# Patient Record
Sex: MALE | Race: WHITE | NOT HISPANIC OR LATINO | Employment: OTHER | URBAN - METROPOLITAN AREA
[De-identification: names, ages, dates, MRNs, and addresses within clinical notes are randomized per-mention and may not be internally consistent; named-entity substitution may affect disease eponyms.]

---

## 2021-09-21 ENCOUNTER — HOSPITAL ENCOUNTER (EMERGENCY)
Facility: HOSPITAL | Age: 51
Discharge: HOME | End: 2021-09-21
Attending: EMERGENCY MEDICINE

## 2021-09-21 VITALS
RESPIRATION RATE: 16 BRPM | OXYGEN SATURATION: 98 % | WEIGHT: 230 LBS | BODY MASS INDEX: 34.86 KG/M2 | HEIGHT: 68 IN | DIASTOLIC BLOOD PRESSURE: 86 MMHG | HEART RATE: 72 BPM | TEMPERATURE: 98 F | SYSTOLIC BLOOD PRESSURE: 164 MMHG

## 2021-09-21 DIAGNOSIS — K65.4 MESENTERIC PANNICULITIS: ICD-10-CM

## 2021-09-21 DIAGNOSIS — R10.9 ABDOMINAL WALL PAIN: Primary | ICD-10-CM

## 2021-09-21 LAB
ALBUMIN SERPL BCP-MCNC: 3.7 G/DL (ref 3.5–5.2)
ALP SERPL-CCNC: 54 U/L (ref 55–135)
ALT SERPL W/O P-5'-P-CCNC: 27 U/L (ref 10–44)
ANION GAP SERPL CALC-SCNC: 10 MMOL/L (ref 8–16)
AST SERPL-CCNC: 21 U/L (ref 10–40)
BACTERIA #/AREA URNS HPF: NEGATIVE /HPF
BASOPHILS # BLD AUTO: 0.05 K/UL (ref 0–0.2)
BASOPHILS NFR BLD: 0.8 % (ref 0–1.9)
BILIRUB SERPL-MCNC: 0.8 MG/DL (ref 0.1–1)
BILIRUB UR QL STRIP: NEGATIVE
BUN SERPL-MCNC: 13 MG/DL (ref 6–20)
CALCIUM SERPL-MCNC: 8.7 MG/DL (ref 8.7–10.5)
CHLORIDE SERPL-SCNC: 102 MMOL/L (ref 95–110)
CLARITY UR: CLEAR
CO2 SERPL-SCNC: 29 MMOL/L (ref 23–29)
COLOR UR: YELLOW
CREAT SERPL-MCNC: 0.8 MG/DL (ref 0.5–1.4)
CREAT SERPL-MCNC: 0.9 MG/DL (ref 0.5–1.4)
CRP SERPL-MCNC: 0.8 MG/DL
DIFFERENTIAL METHOD: NORMAL
EOSINOPHIL # BLD AUTO: 0.1 K/UL (ref 0–0.5)
EOSINOPHIL NFR BLD: 1.7 % (ref 0–8)
ERYTHROCYTE [DISTWIDTH] IN BLOOD BY AUTOMATED COUNT: 12.7 % (ref 11.5–14.5)
ERYTHROCYTE [SEDIMENTATION RATE] IN BLOOD BY WESTERGREN METHOD: 3 MM/HR (ref 0–10)
EST. GFR  (AFRICAN AMERICAN): >60 ML/MIN/1.73 M^2
EST. GFR  (NON AFRICAN AMERICAN): >60 ML/MIN/1.73 M^2
GLUCOSE SERPL-MCNC: 115 MG/DL (ref 70–110)
GLUCOSE UR QL STRIP: ABNORMAL
HCT VFR BLD AUTO: 46.4 % (ref 40–54)
HGB BLD-MCNC: 15.4 G/DL (ref 14–18)
HGB UR QL STRIP: NEGATIVE
HYALINE CASTS #/AREA URNS LPF: 9 /LPF
IMM GRANULOCYTES # BLD AUTO: 0.03 K/UL (ref 0–0.04)
IMM GRANULOCYTES NFR BLD AUTO: 0.5 % (ref 0–0.5)
KETONES UR QL STRIP: NEGATIVE
LEUKOCYTE ESTERASE UR QL STRIP: ABNORMAL
LIPASE SERPL-CCNC: 26 U/L (ref 4–60)
LYMPHOCYTES # BLD AUTO: 1.5 K/UL (ref 1–4.8)
LYMPHOCYTES NFR BLD: 23.3 % (ref 18–48)
MCH RBC QN AUTO: 30.9 PG (ref 27–31)
MCHC RBC AUTO-ENTMCNC: 33.2 G/DL (ref 32–36)
MCV RBC AUTO: 93 FL (ref 82–98)
MICROSCOPIC COMMENT: ABNORMAL
MONOCYTES # BLD AUTO: 0.5 K/UL (ref 0.3–1)
MONOCYTES NFR BLD: 8.5 % (ref 4–15)
NEUTROPHILS # BLD AUTO: 4.1 K/UL (ref 1.8–7.7)
NEUTROPHILS NFR BLD: 65.2 % (ref 38–73)
NITRITE UR QL STRIP: NEGATIVE
NRBC BLD-RTO: 0 /100 WBC
PH UR STRIP: 6 [PH] (ref 5–8)
PLATELET # BLD AUTO: 208 K/UL (ref 150–450)
PMV BLD AUTO: 10.1 FL (ref 9.2–12.9)
POTASSIUM SERPL-SCNC: 4 MMOL/L (ref 3.5–5.1)
PROT SERPL-MCNC: 6.8 G/DL (ref 6–8.4)
PROT UR QL STRIP: ABNORMAL
RBC # BLD AUTO: 4.98 M/UL (ref 4.6–6.2)
RBC #/AREA URNS HPF: 1 /HPF (ref 0–4)
SAMPLE: NORMAL
SODIUM SERPL-SCNC: 141 MMOL/L (ref 136–145)
SP GR UR STRIP: 1.02 (ref 1–1.03)
SQUAMOUS #/AREA URNS HPF: 2 /HPF
URN SPEC COLLECT METH UR: ABNORMAL
UROBILINOGEN UR STRIP-ACNC: NEGATIVE EU/DL
WBC # BLD AUTO: 6.32 K/UL (ref 3.9–12.7)
WBC #/AREA URNS HPF: 14 /HPF (ref 0–5)

## 2021-09-21 PROCEDURE — 99285 EMERGENCY DEPT VISIT HI MDM: CPT | Mod: 25

## 2021-09-21 PROCEDURE — 86140 C-REACTIVE PROTEIN: CPT | Performed by: NURSE PRACTITIONER

## 2021-09-21 PROCEDURE — 25000003 PHARM REV CODE 250: Performed by: NURSE PRACTITIONER

## 2021-09-21 PROCEDURE — 87086 URINE CULTURE/COLONY COUNT: CPT | Performed by: NURSE PRACTITIONER

## 2021-09-21 PROCEDURE — 25500020 PHARM REV CODE 255: Performed by: EMERGENCY MEDICINE

## 2021-09-21 PROCEDURE — 96374 THER/PROPH/DIAG INJ IV PUSH: CPT

## 2021-09-21 PROCEDURE — 63600175 PHARM REV CODE 636 W HCPCS: Performed by: NURSE PRACTITIONER

## 2021-09-21 PROCEDURE — 80053 COMPREHEN METABOLIC PANEL: CPT | Performed by: NURSE PRACTITIONER

## 2021-09-21 PROCEDURE — 36415 COLL VENOUS BLD VENIPUNCTURE: CPT | Performed by: NURSE PRACTITIONER

## 2021-09-21 PROCEDURE — 81001 URINALYSIS AUTO W/SCOPE: CPT | Performed by: NURSE PRACTITIONER

## 2021-09-21 PROCEDURE — 85025 COMPLETE CBC W/AUTO DIFF WBC: CPT | Performed by: NURSE PRACTITIONER

## 2021-09-21 PROCEDURE — 85651 RBC SED RATE NONAUTOMATED: CPT | Performed by: NURSE PRACTITIONER

## 2021-09-21 PROCEDURE — 83690 ASSAY OF LIPASE: CPT | Performed by: NURSE PRACTITIONER

## 2021-09-21 PROCEDURE — 96361 HYDRATE IV INFUSION ADD-ON: CPT

## 2021-09-21 RX ORDER — KETOROLAC TROMETHAMINE 10 MG/1
10 TABLET, FILM COATED ORAL EVERY 6 HOURS PRN
Qty: 20 TABLET | Refills: 0 | Status: SHIPPED | OUTPATIENT
Start: 2021-09-21 | End: 2021-09-26

## 2021-09-21 RX ORDER — ACETAMINOPHEN, ASPIRIN (NSAID), AND CAFFEINE 250; 250; 65 MG/1; MG/1; MG/1
1 TABLET, FILM COATED ORAL EVERY 6 HOURS PRN
COMMUNITY

## 2021-09-21 RX ORDER — KETOROLAC TROMETHAMINE 30 MG/ML
15 INJECTION, SOLUTION INTRAMUSCULAR; INTRAVENOUS
Status: COMPLETED | OUTPATIENT
Start: 2021-09-21 | End: 2021-09-21

## 2021-09-21 RX ADMIN — KETOROLAC TROMETHAMINE 15 MG: 30 INJECTION, SOLUTION INTRAMUSCULAR; INTRAVENOUS at 09:09

## 2021-09-21 RX ADMIN — SODIUM CHLORIDE 1000 ML: 0.9 INJECTION, SOLUTION INTRAVENOUS at 09:09

## 2021-09-21 RX ADMIN — IOHEXOL 100 ML: 350 INJECTION, SOLUTION INTRAVENOUS at 09:09

## 2021-09-23 LAB — BACTERIA UR CULT: NO GROWTH

## 2021-09-27 DIAGNOSIS — R10.11 ABDOMINAL PAIN, RIGHT UPPER QUADRANT: Primary | ICD-10-CM

## 2024-06-24 ENCOUNTER — HOSPITAL ENCOUNTER (INPATIENT)
Facility: HOSPITAL | Age: 54
LOS: 8 days | Discharge: HOME HEALTH | DRG: 504 | End: 2024-07-02
Attending: EMERGENCY MEDICINE | Admitting: HOSPITALIST
Payer: MEDICAID

## 2024-06-24 DIAGNOSIS — I10 HYPERTENSION: ICD-10-CM

## 2024-06-24 DIAGNOSIS — L03.116 CELLULITIS OF LEFT FOOT: ICD-10-CM

## 2024-06-24 DIAGNOSIS — L03.116 CELLULITIS OF LEFT LOWER EXTREMITY: Primary | ICD-10-CM

## 2024-06-24 DIAGNOSIS — L97.509 ULCER OF FOOT: ICD-10-CM

## 2024-06-24 DIAGNOSIS — E66.9 OBESITY (BMI 35.0-39.9 WITHOUT COMORBIDITY): ICD-10-CM

## 2024-06-24 LAB — POCT GLUCOSE: 118 MG/DL (ref 70–110)

## 2024-06-24 PROCEDURE — 99285 EMERGENCY DEPT VISIT HI MDM: CPT | Mod: 25

## 2024-06-24 PROCEDURE — 11000001 HC ACUTE MED/SURG PRIVATE ROOM

## 2024-06-24 PROCEDURE — 82962 GLUCOSE BLOOD TEST: CPT

## 2024-06-25 PROBLEM — E66.9 OBESITY (BMI 35.0-39.9 WITHOUT COMORBIDITY): Status: ACTIVE | Noted: 2024-06-25

## 2024-06-25 PROBLEM — L03.116 CELLULITIS OF LEFT FOOT: Status: ACTIVE | Noted: 2024-06-25

## 2024-06-25 LAB
ALBUMIN SERPL BCP-MCNC: 3.2 G/DL (ref 3.5–5.2)
ALP SERPL-CCNC: 59 U/L (ref 55–135)
ALT SERPL W/O P-5'-P-CCNC: 36 U/L (ref 10–44)
ANION GAP SERPL CALC-SCNC: 11 MMOL/L (ref 8–16)
APTT PPP: 29.6 SEC (ref 21–32)
AST SERPL-CCNC: 29 U/L (ref 10–40)
BACTERIA #/AREA URNS HPF: ABNORMAL /HPF
BASOPHILS # BLD AUTO: 0.07 K/UL (ref 0–0.2)
BASOPHILS NFR BLD: 1 % (ref 0–1.9)
BILIRUB SERPL-MCNC: 0.5 MG/DL (ref 0.1–1)
BILIRUB UR QL STRIP: NEGATIVE
BUN SERPL-MCNC: 16 MG/DL (ref 6–20)
CALCIUM SERPL-MCNC: 9.6 MG/DL (ref 8.7–10.5)
CHLORIDE SERPL-SCNC: 102 MMOL/L (ref 95–110)
CHOLEST SERPL-MCNC: 135 MG/DL (ref 120–199)
CHOLEST/HDLC SERPL: 4.7 {RATIO} (ref 2–5)
CLARITY UR: CLEAR
CO2 SERPL-SCNC: 26 MMOL/L (ref 23–29)
COLOR UR: YELLOW
CREAT SERPL-MCNC: 0.9 MG/DL (ref 0.5–1.4)
CRP SERPL-MCNC: 88.3 MG/L (ref 0–8.2)
DIFFERENTIAL METHOD BLD: ABNORMAL
EOSINOPHIL # BLD AUTO: 0.3 K/UL (ref 0–0.5)
EOSINOPHIL NFR BLD: 4.2 % (ref 0–8)
ERYTHROCYTE [DISTWIDTH] IN BLOOD BY AUTOMATED COUNT: 11.9 % (ref 11.5–14.5)
ERYTHROCYTE [SEDIMENTATION RATE] IN BLOOD BY WESTERGREN METHOD: 80 MM/HR (ref 0–10)
EST. GFR  (NO RACE VARIABLE): >60 ML/MIN/1.73 M^2
ESTIMATED AVG GLUCOSE: 111 MG/DL (ref 68–131)
GLUCOSE SERPL-MCNC: 113 MG/DL (ref 70–110)
GLUCOSE UR QL STRIP: ABNORMAL
HBA1C MFR BLD: 5.5 % (ref 4.5–6.2)
HCT VFR BLD AUTO: 42.9 % (ref 40–54)
HDLC SERPL-MCNC: 29 MG/DL (ref 40–75)
HDLC SERPL: 21.5 % (ref 20–50)
HGB BLD-MCNC: 14.2 G/DL (ref 14–18)
HGB UR QL STRIP: NEGATIVE
HYALINE CASTS #/AREA URNS LPF: 1 /LPF
IMM GRANULOCYTES # BLD AUTO: 0.05 K/UL (ref 0–0.04)
IMM GRANULOCYTES NFR BLD AUTO: 0.7 % (ref 0–0.5)
INR PPP: 1.1 (ref 0.8–1.2)
KETONES UR QL STRIP: NEGATIVE
LACTATE SERPL-SCNC: 1.2 MMOL/L (ref 0.5–2.2)
LDLC SERPL CALC-MCNC: 88.4 MG/DL (ref 63–159)
LEUKOCYTE ESTERASE UR QL STRIP: ABNORMAL
LYMPHOCYTES # BLD AUTO: 2.1 K/UL (ref 1–4.8)
LYMPHOCYTES NFR BLD: 29.5 % (ref 18–48)
MAGNESIUM SERPL-MCNC: 2 MG/DL (ref 1.6–2.6)
MCH RBC QN AUTO: 31.3 PG (ref 27–31)
MCHC RBC AUTO-ENTMCNC: 33.1 G/DL (ref 32–36)
MCV RBC AUTO: 95 FL (ref 82–98)
MICROSCOPIC COMMENT: ABNORMAL
MONOCYTES # BLD AUTO: 0.9 K/UL (ref 0.3–1)
MONOCYTES NFR BLD: 12.8 % (ref 4–15)
MRSA SCREEN BY PCR: NEGATIVE
NEUTROPHILS # BLD AUTO: 3.7 K/UL (ref 1.8–7.7)
NEUTROPHILS NFR BLD: 51.8 % (ref 38–73)
NITRITE UR QL STRIP: NEGATIVE
NONHDLC SERPL-MCNC: 106 MG/DL
NRBC BLD-RTO: 0 /100 WBC
PH UR STRIP: 6 [PH] (ref 5–8)
PHOSPHATE SERPL-MCNC: 3.3 MG/DL (ref 2.7–4.5)
PLATELET # BLD AUTO: 336 K/UL (ref 150–450)
PMV BLD AUTO: 9.3 FL (ref 9.2–12.9)
POTASSIUM SERPL-SCNC: 4 MMOL/L (ref 3.5–5.1)
PROT SERPL-MCNC: 7.5 G/DL (ref 6–8.4)
PROT UR QL STRIP: ABNORMAL
PROTHROMBIN TIME: 11.4 SEC (ref 9–12.5)
RBC # BLD AUTO: 4.54 M/UL (ref 4.6–6.2)
RBC #/AREA URNS HPF: 2 /HPF (ref 0–4)
SODIUM SERPL-SCNC: 139 MMOL/L (ref 136–145)
SP GR UR STRIP: >1.03 (ref 1–1.03)
SQUAMOUS #/AREA URNS HPF: 1 /HPF
T4 FREE SERPL-MCNC: 1.18 NG/DL (ref 0.71–1.51)
TRIGL SERPL-MCNC: 88 MG/DL (ref 30–150)
TSH SERPL DL<=0.005 MIU/L-ACNC: 2.39 UIU/ML (ref 0.4–4)
URN SPEC COLLECT METH UR: ABNORMAL
UROBILINOGEN UR STRIP-ACNC: NEGATIVE EU/DL
WBC # BLD AUTO: 7.19 K/UL (ref 3.9–12.7)
WBC #/AREA URNS HPF: 38 /HPF (ref 0–5)
YEAST URNS QL MICRO: ABNORMAL

## 2024-06-25 PROCEDURE — 87641 MR-STAPH DNA AMP PROBE: CPT | Performed by: STUDENT IN AN ORGANIZED HEALTH CARE EDUCATION/TRAINING PROGRAM

## 2024-06-25 PROCEDURE — 63600175 PHARM REV CODE 636 W HCPCS: Performed by: EMERGENCY MEDICINE

## 2024-06-25 PROCEDURE — 97535 SELF CARE MNGMENT TRAINING: CPT

## 2024-06-25 PROCEDURE — 25000003 PHARM REV CODE 250: Performed by: STUDENT IN AN ORGANIZED HEALTH CARE EDUCATION/TRAINING PROGRAM

## 2024-06-25 PROCEDURE — 97161 PT EVAL LOW COMPLEX 20 MIN: CPT

## 2024-06-25 PROCEDURE — 80053 COMPREHEN METABOLIC PANEL: CPT | Performed by: EMERGENCY MEDICINE

## 2024-06-25 PROCEDURE — 36415 COLL VENOUS BLD VENIPUNCTURE: CPT

## 2024-06-25 PROCEDURE — 25000003 PHARM REV CODE 250

## 2024-06-25 PROCEDURE — 81000 URINALYSIS NONAUTO W/SCOPE: CPT | Performed by: EMERGENCY MEDICINE

## 2024-06-25 PROCEDURE — 96365 THER/PROPH/DIAG IV INF INIT: CPT

## 2024-06-25 PROCEDURE — 63600175 PHARM REV CODE 636 W HCPCS: Performed by: STUDENT IN AN ORGANIZED HEALTH CARE EDUCATION/TRAINING PROGRAM

## 2024-06-25 PROCEDURE — 25000003 PHARM REV CODE 250: Performed by: EMERGENCY MEDICINE

## 2024-06-25 PROCEDURE — 63600175 PHARM REV CODE 636 W HCPCS: Mod: JZ,JG | Performed by: STUDENT IN AN ORGANIZED HEALTH CARE EDUCATION/TRAINING PROGRAM

## 2024-06-25 PROCEDURE — 87070 CULTURE OTHR SPECIMN AEROBIC: CPT | Mod: 59 | Performed by: PODIATRIST

## 2024-06-25 PROCEDURE — 84100 ASSAY OF PHOSPHORUS: CPT

## 2024-06-25 PROCEDURE — 83036 HEMOGLOBIN GLYCOSYLATED A1C: CPT

## 2024-06-25 PROCEDURE — 99222 1ST HOSP IP/OBS MODERATE 55: CPT | Mod: ,,, | Performed by: PODIATRIST

## 2024-06-25 PROCEDURE — 93010 ELECTROCARDIOGRAM REPORT: CPT | Mod: ,,, | Performed by: INTERNAL MEDICINE

## 2024-06-25 PROCEDURE — 87086 URINE CULTURE/COLONY COUNT: CPT | Performed by: EMERGENCY MEDICINE

## 2024-06-25 PROCEDURE — 80061 LIPID PANEL: CPT

## 2024-06-25 PROCEDURE — 87147 CULTURE TYPE IMMUNOLOGIC: CPT | Performed by: STUDENT IN AN ORGANIZED HEALTH CARE EDUCATION/TRAINING PROGRAM

## 2024-06-25 PROCEDURE — 84443 ASSAY THYROID STIM HORMONE: CPT

## 2024-06-25 PROCEDURE — 25000003 PHARM REV CODE 250: Performed by: HOSPITALIST

## 2024-06-25 PROCEDURE — 36415 COLL VENOUS BLD VENIPUNCTURE: CPT | Performed by: EMERGENCY MEDICINE

## 2024-06-25 PROCEDURE — 97165 OT EVAL LOW COMPLEX 30 MIN: CPT

## 2024-06-25 PROCEDURE — 86140 C-REACTIVE PROTEIN: CPT

## 2024-06-25 PROCEDURE — 87070 CULTURE OTHR SPECIMN AEROBIC: CPT | Performed by: STUDENT IN AN ORGANIZED HEALTH CARE EDUCATION/TRAINING PROGRAM

## 2024-06-25 PROCEDURE — 93005 ELECTROCARDIOGRAM TRACING: CPT

## 2024-06-25 PROCEDURE — 85025 COMPLETE CBC W/AUTO DIFF WBC: CPT | Performed by: EMERGENCY MEDICINE

## 2024-06-25 PROCEDURE — 84439 ASSAY OF FREE THYROXINE: CPT

## 2024-06-25 PROCEDURE — 87040 BLOOD CULTURE FOR BACTERIA: CPT | Mod: 59 | Performed by: EMERGENCY MEDICINE

## 2024-06-25 PROCEDURE — 87075 CULTR BACTERIA EXCEPT BLOOD: CPT | Mod: 59 | Performed by: PODIATRIST

## 2024-06-25 PROCEDURE — 85651 RBC SED RATE NONAUTOMATED: CPT

## 2024-06-25 PROCEDURE — 87075 CULTR BACTERIA EXCEPT BLOOD: CPT | Performed by: STUDENT IN AN ORGANIZED HEALTH CARE EDUCATION/TRAINING PROGRAM

## 2024-06-25 PROCEDURE — 63600175 PHARM REV CODE 636 W HCPCS: Performed by: HOSPITALIST

## 2024-06-25 PROCEDURE — 87077 CULTURE AEROBIC IDENTIFY: CPT | Performed by: STUDENT IN AN ORGANIZED HEALTH CARE EDUCATION/TRAINING PROGRAM

## 2024-06-25 PROCEDURE — 11000001 HC ACUTE MED/SURG PRIVATE ROOM

## 2024-06-25 PROCEDURE — 83735 ASSAY OF MAGNESIUM: CPT

## 2024-06-25 PROCEDURE — 87186 SC STD MICRODIL/AGAR DIL: CPT | Performed by: STUDENT IN AN ORGANIZED HEALTH CARE EDUCATION/TRAINING PROGRAM

## 2024-06-25 PROCEDURE — 99223 1ST HOSP IP/OBS HIGH 75: CPT | Mod: ,,, | Performed by: STUDENT IN AN ORGANIZED HEALTH CARE EDUCATION/TRAINING PROGRAM

## 2024-06-25 PROCEDURE — 85730 THROMBOPLASTIN TIME PARTIAL: CPT

## 2024-06-25 PROCEDURE — 85610 PROTHROMBIN TIME: CPT

## 2024-06-25 PROCEDURE — 83605 ASSAY OF LACTIC ACID: CPT | Performed by: EMERGENCY MEDICINE

## 2024-06-25 RX ORDER — AMOXICILLIN 250 MG
1 CAPSULE ORAL 2 TIMES DAILY PRN
Status: DISCONTINUED | OUTPATIENT
Start: 2024-06-25 | End: 2024-07-02 | Stop reason: HOSPADM

## 2024-06-25 RX ORDER — GLUCAGON 1 MG
1 KIT INJECTION
Status: DISCONTINUED | OUTPATIENT
Start: 2024-06-25 | End: 2024-07-02 | Stop reason: HOSPADM

## 2024-06-25 RX ORDER — NALOXONE HCL 0.4 MG/ML
0.02 VIAL (ML) INJECTION
Status: DISCONTINUED | OUTPATIENT
Start: 2024-06-25 | End: 2024-07-02 | Stop reason: HOSPADM

## 2024-06-25 RX ORDER — LANOLIN ALCOHOL/MO/W.PET/CERES
800 CREAM (GRAM) TOPICAL
Status: DISCONTINUED | OUTPATIENT
Start: 2024-06-25 | End: 2024-07-02 | Stop reason: HOSPADM

## 2024-06-25 RX ORDER — IBUPROFEN 200 MG
16 TABLET ORAL
Status: DISCONTINUED | OUTPATIENT
Start: 2024-06-25 | End: 2024-07-02 | Stop reason: HOSPADM

## 2024-06-25 RX ORDER — HYDROCODONE BITARTRATE AND ACETAMINOPHEN 5; 325 MG/1; MG/1
1 TABLET ORAL EVERY 6 HOURS PRN
Status: DISCONTINUED | OUTPATIENT
Start: 2024-06-25 | End: 2024-06-27 | Stop reason: SDUPTHER

## 2024-06-25 RX ORDER — MUPIROCIN 20 MG/G
1 OINTMENT TOPICAL 2 TIMES DAILY
COMMUNITY
Start: 2024-05-10

## 2024-06-25 RX ORDER — ACETAMINOPHEN 325 MG/1
650 TABLET ORAL EVERY 4 HOURS PRN
Status: DISCONTINUED | OUTPATIENT
Start: 2024-06-25 | End: 2024-07-02 | Stop reason: HOSPADM

## 2024-06-25 RX ORDER — CLINDAMYCIN PHOSPHATE 900 MG/50ML
900 INJECTION, SOLUTION INTRAVENOUS
Status: COMPLETED | OUTPATIENT
Start: 2024-06-25 | End: 2024-06-28

## 2024-06-25 RX ORDER — IBUPROFEN 200 MG
24 TABLET ORAL
Status: DISCONTINUED | OUTPATIENT
Start: 2024-06-25 | End: 2024-07-02 | Stop reason: HOSPADM

## 2024-06-25 RX ORDER — SODIUM CHLORIDE 0.9 % (FLUSH) 0.9 %
10 SYRINGE (ML) INJECTION EVERY 12 HOURS PRN
Status: DISCONTINUED | OUTPATIENT
Start: 2024-06-25 | End: 2024-07-02 | Stop reason: HOSPADM

## 2024-06-25 RX ORDER — PROCHLORPERAZINE EDISYLATE 5 MG/ML
5 INJECTION INTRAMUSCULAR; INTRAVENOUS EVERY 6 HOURS PRN
Status: DISCONTINUED | OUTPATIENT
Start: 2024-06-25 | End: 2024-07-02 | Stop reason: HOSPADM

## 2024-06-25 RX ORDER — SODIUM CHLORIDE 9 MG/ML
INJECTION, SOLUTION INTRAVENOUS CONTINUOUS
Status: DISCONTINUED | OUTPATIENT
Start: 2024-06-25 | End: 2024-07-02 | Stop reason: HOSPADM

## 2024-06-25 RX ORDER — CIPROFLOXACIN 500 MG/1
500 TABLET ORAL 2 TIMES DAILY
COMMUNITY
Start: 2024-05-10 | End: 2024-06-25 | Stop reason: ALTCHOICE

## 2024-06-25 RX ORDER — METRONIDAZOLE 500 MG/1
500 TABLET ORAL EVERY 8 HOURS
Status: DISCONTINUED | OUTPATIENT
Start: 2024-06-25 | End: 2024-06-28

## 2024-06-25 RX ORDER — ONDANSETRON HYDROCHLORIDE 2 MG/ML
4 INJECTION, SOLUTION INTRAVENOUS EVERY 6 HOURS PRN
Status: DISCONTINUED | OUTPATIENT
Start: 2024-06-25 | End: 2024-06-27 | Stop reason: SDUPTHER

## 2024-06-25 RX ORDER — METRONIDAZOLE 500 MG/100ML
500 INJECTION, SOLUTION INTRAVENOUS
Status: DISCONTINUED | OUTPATIENT
Start: 2024-06-25 | End: 2024-06-25

## 2024-06-25 RX ORDER — ALUMINUM HYDROXIDE, MAGNESIUM HYDROXIDE, AND SIMETHICONE 1200; 120; 1200 MG/30ML; MG/30ML; MG/30ML
30 SUSPENSION ORAL 4 TIMES DAILY PRN
Status: DISCONTINUED | OUTPATIENT
Start: 2024-06-25 | End: 2024-07-02 | Stop reason: HOSPADM

## 2024-06-25 RX ORDER — MORPHINE SULFATE 4 MG/ML
2 INJECTION, SOLUTION INTRAMUSCULAR; INTRAVENOUS EVERY 6 HOURS PRN
Status: DISCONTINUED | OUTPATIENT
Start: 2024-06-25 | End: 2024-06-27 | Stop reason: SDUPTHER

## 2024-06-25 RX ADMIN — CEFEPIME 2 G: 2 INJECTION, POWDER, FOR SOLUTION INTRAVENOUS at 08:06

## 2024-06-25 RX ADMIN — VANCOMYCIN HYDROCHLORIDE 1750 MG: 1 INJECTION, POWDER, LYOPHILIZED, FOR SOLUTION INTRAVENOUS at 12:06

## 2024-06-25 RX ADMIN — CLINDAMYCIN IN 5 PERCENT DEXTROSE 900 MG: 18 INJECTION, SOLUTION INTRAVENOUS at 09:06

## 2024-06-25 RX ADMIN — METRONIDAZOLE 500 MG: 500 TABLET ORAL at 10:06

## 2024-06-25 RX ADMIN — METRONIDAZOLE 500 MG: 500 INJECTION, SOLUTION INTRAVENOUS at 12:06

## 2024-06-25 RX ADMIN — SODIUM CHLORIDE: 9 INJECTION, SOLUTION INTRAVENOUS at 09:06

## 2024-06-25 RX ADMIN — CLINDAMYCIN IN 5 PERCENT DEXTROSE 900 MG: 18 INJECTION, SOLUTION INTRAVENOUS at 02:06

## 2024-06-25 RX ADMIN — CEFEPIME 2 G: 2 INJECTION, POWDER, FOR SOLUTION INTRAVENOUS at 02:06

## 2024-06-25 RX ADMIN — VANCOMYCIN HYDROCHLORIDE 1750 MG: 1 INJECTION, POWDER, LYOPHILIZED, FOR SOLUTION INTRAVENOUS at 03:06

## 2024-06-25 RX ADMIN — SODIUM CHLORIDE: 9 INJECTION, SOLUTION INTRAVENOUS at 03:06

## 2024-06-25 NOTE — CONSULTS
"[facility]   Department of Infectious Disease  Consult Note        PATIENT NAME: Salvador Bermudez  MRN: [id number]  TODAY'S DATE: 06/25/2024  ADMIT DATE: 6/24/2024  LOS: 0 days    CHIEF COMPLAINT: Wound Check (Left foot wound, started 3 months ago after wearing a new pair of shoes on a cruise. )      PRINCIPLE PROBLEM: Cellulitis of left foot    REASON FOR CONSULT:  Osteo    ASSESSMENT and PLAN     Severe left foot National SSTi/rule out abscess in the setting of chronic nonhealing foot ulcer   ESR and CRP extremely high   Blood cultures x2 sets no growth to date, pending final     Daily alcohol intake    RECOMMENDATIONS:   Adequate source control   Podiatry for I&D and washout of left 5th metatarsal - please send deep tissue and bone to pathology Gram stain and cultures including AFB and fungal   MRI left foot in process  Follow LE arterial ultrasound  MRSA nasal swab   Follow bedside cultures   Continue vancomycin IV, keep level 15-20   Start cefepime 2 g IV q.8  Flagyl 500 mg IV q.8 for anaerobic coverage   Follow A1c   Wound care to all affected areas while awaiting surgery  Pain management as per hospitalist     Discussed at length with patient, ER nursing    Thank you for this consult. Please send [employer] secure chat with any questions.    Antibiotics (From admission, onward)      Start     Stop Route Frequency Ordered    06/25/24 1300  vancomycin (VANCOCIN) 1,750 mg in D5W 500 mL IVPB         -- IV Every 12 hours (non-standard times) 06/25/24 0311    06/25/24 0247  vancomycin - pharmacy to dose  (vancomycin IVPB (PEDS and ADULTS))        Placed in "And" Linked Group    -- IV pharmacy to manage frequency 06/25/24 0147          Antifungals (From admission, onward)      None           Antivirals (From admission, onward)      None            HPI      Salvador Bermudez is a 54 y.o. male morbidly obese, BMI 38.1 Kg/m2, with no prior medical history, does not have establish primary care physician who presented " with worsening left foot ulcer.  Patient reports he has had this for at least 3 months, started with a blister which progress to a wound that is started draining for the last couple of weeks.  He has a establish care at [facility], he has been seen there twice and has been debrided.  He mentions they did swab culture and he was told there was no infection and he has not been prescribed any antibiotics.  Patient mentions over the weekend he is noticed worsening redness, edema, pain to lateral site of left foot.  He denies fever or chills, no night sweats or cough or shortness of breath.  No nausea or vomiting, no chest pain, no abdominal pain, no dysuria increased urinary frequency, no changes in bowel movements.    He has been doing wound care at home and did noticed bleeding from the wound which prompted him to come to the hospital.    Patient seen examined in the ER, hemodynamically stable, afebrile  Labs on admission white count 7.1, no left shift, H&H 14.2/42.9, platelet count 336.  Sed rate 80, CRP 88.3, extremely high   Stable electrolytes, normal kidney and liver function tests   Lactic acid 1.2, normal   Hemoglobin A1c 5.5 normal   UA with pyuria of 38, patient asymptomatic   Left foot x-ray with soft tissue swelling and appearance suggestive of ulcer in the soft tissues lateral to the 5th metatarsophalangeal joint.  Suggesting MRI.    Patient empirically started on vancomycin IV     Cultures from left foot wound taking at bedside    Outdoor activities:  Nonsmoker, drinks at least 6 to 12 beers a day, owns for auto part stores.  Lives with his wife at home, has a dog.  Travel:  None  Implants:  None  Antibiotic history:  See HPI.    Social History  Marital Status:   Alcohol History:  reports current alcohol use of about 6.0 standard drinks of alcohol per week.  Tobacco History:  reports that he has never smoked. He does not have any smokeless tobacco history on file.  Drug History:  has no history on  file for drug use.    Review of patient's allergies indicates:  No Known Allergies  Past Medical History:   Diagnosis Date    Obese      No past surgical history on file.  No family history on file.    SUBJECTIVE     Review of systems  Constitutional:  Denies fevers, chills, night sweats, loss of appetite.  HEENT: Denies visual changes, ear pain, sinus congestion, mouth pain or trouble swallowing, sore throat or dental pain.  Neck: Denies neck pain or lumps.  Respiratory: Denies shortness of breath, coughing, wheezing or hemoptysis.  Cardiovascular:  Denies chest pain, palpitations or edema.  Gastrointestinal: Denies  nausea, vomiting, constipation or diarrhea.  Genitourinary:  Denies dysuria, frequency, urgency or hematuria   Musculoskeletal:  Denies joint pain or swelling, difficulty walking.    Skin:  Denies rash or itching.  Worsening left foot redness, pain, edema consistent with worsening cellulitis, rule out abscess  Neurologic:  Denies motor sensory loss, headaches or dizziness.    Psychiatric:  Denies changes in mood or behavior.     OBJECTIVE   Temp:  [98.7 °F (37.1 °C)-99.1 °F (37.3 °C)] 98.8 °F (37.1 °C)  Pulse:  [67-87] 70  Resp:  [16-20] 16  SpO2:  [91 %-97 %] 95 %  BP: (135-150)/(64-87) 150/76  Temp:  [98.7 °F (37.1 °C)-99.1 °F (37.3 °C)]   Temp: 98.8 °F (37.1 °C) (06/25/24 0400)  Pulse: 70 (06/25/24 0659)  Resp: 16 (06/25/24 0400)  BP: (!) 150/76 (06/25/24 0400)  SpO2: 95 % (06/25/24 0659)    Intake/Output Summary (Last 24 hours) at 6/25/2024 0841  Last data filed at 6/25/2024 0534  Gross per 24 hour   Intake --   Output 800 ml   Net -800 ml       Physical Exam  General:  Morbidly obese  male, lying in bed in no acute distress, breathing comfortable on room air  Eyes: Eyes with no icterus or injection. Vision grossly normal  Ears: Hearing grossly normal.  Nose: Nares patent  Mouth: Moist mucous membranes, dentition is good. No ulcerations, erythema or exudates.  Neck: Supple, no tenderness  "to palpation.  Cardiovascular: Regular rate and rhythm, no murmurs, no edema.    Respiratory:  Clear to auscultation bilaterally, no tachypnea or increased work of breathing.  Gastrointestinal:  Soft with active bowel sounds, no tenderness to palpation, no distention.  Genitourinary:  No suprapubic tenderness.  Musculoskeletal:  Moves all extremities with good strength.    Skin:  Warm and dry, impressive left foot cellulitis, rule out abscess by 5th metatarsal.  Sluggish on skin noted, bloody thick drainage sent for culture, tender to palpation consistent with ongoing abscess.  Neuro:   Oriented, conversant, follows commands.  Psych: Good mood, normal affect.   VAD:  PIV  ISOLATION:  None    Wounds:   6/25:           Significant Labs: All pertinent labs within the past 24 hours have been reviewed.    CBC LAST 7  Recent Labs   Lab 06/25/24 0027   WBC 7.19   RBC 4.54*   HGB 14.2   HCT 42.9   MCV 95   MCH 31.3*   MCHC 33.1   RDW 11.9      MPV 9.3   GRAN 51.8  3.7   LYMPH 29.5  2.1   MONO 12.8  0.9   BASO 0.07   NRBC 0       CHEMISTRY LAST 7  Recent Labs   Lab 06/25/24 0027 06/25/24  0524     --    K 4.0  --      --    CO2 26  --    ANIONGAP 11  --    BUN 16  --    CREATININE 0.9  --    *  --    CALCIUM 9.6  --    MG  --  2.0   ALBUMIN 3.2*  --    PROT 7.5  --    ALKPHOS 59  --    ALT 36  --    AST 29  --    BILITOT 0.5  --        Estimated Creatinine Clearance: 117.6 mL/min (based on SCr of 0.9 mg/dL).    INFLAMMATORY/PROCAL  LAST 7  Recent Labs   Lab 06/25/24  0524   CRP 88.3*     No results found for: "ESR"  CRP   Date Value Ref Range Status   06/25/2024 88.3 (H) 0.0 - 8.2 mg/L Final   09/21/2021 0.80 (H) <0.76 mg/dL Final       PRIOR  MICROBIOLOGY:    No results found for the last 90 days.        LAST 7 DAYS MICROBIOLOGY   Microbiology Results (last 7 days)       Procedure Component Value Units Date/Time    Urine culture [[id number]] Collected: 06/25/24 0044    Order Status: No result " Specimen: Urine Updated: 06/25/24 0115    Blood culture #1 **CANNOT BE ORDERED STAT** [[id number]] Collected: 06/25/24 0027    Order Status: Sent Specimen: Blood from Peripheral, Forearm, Right     Blood culture #2 **CANNOT BE ORDERED STAT** [[id number]] Collected: 06/25/24 0027    Order Status: Sent Specimen: Blood from Peripheral, Forearm, Left               CURRENT/PREVIOUS VISIT EKG  No results found for this or any previous visit.         Significant Imaging: I have reviewed all relevant and available imaging results/findings within the past 24 hours.    X-ray L foot: Soft tissue swelling and appearance suggesting ulcer in the soft tissues lateral to the 5th metatarsophalangeal joint.  No definitive findings of osteomyelitis but suggest further evaluation with MRI if indicated clinically.     I spent a total of 80 minutes on the day of the visit.This includes face to face time and non-face to face time preparing to see the patient (eg, review of tests), obtaining and/or reviewing separately obtained history, documenting clinical information in the electronic or other health record, independently interpreting results and communicating results to the patient/family/caregiver, or care coordinator.    Carolynn Armenta MD  Date of Service: 06/25/2024      This note was created using [employer] voice recognition software that occasionally misinterpreted phrases or words.

## 2024-06-25 NOTE — SUBJECTIVE & OBJECTIVE
Scheduled Meds:   ceFEPime IV (PEDS and ADULTS)  2 g Intravenous Q8H    clindamycin IV (PEDS and ADULTS)  900 mg Intravenous Q8H    metronidazole  500 mg Intravenous Q8H    vancomycin (VANCOCIN) IV (PEDS and ADULTS)  1,750 mg Intravenous Q12H     Continuous Infusions:   0.9% NaCl   Intravenous Continuous 100 mL/hr at 06/25/24 0348 New Bag at 06/25/24 0348     PRN Meds:  Current Facility-Administered Medications:     acetaminophen, 650 mg, Oral, Q4H PRN    aluminum-magnesium hydroxide-simethicone, 30 mL, Oral, QID PRN    dextrose 10%, 12.5 g, Intravenous, PRN    dextrose 10%, 25 g, Intravenous, PRN    glucagon (human recombinant), 1 mg, Intramuscular, PRN    glucose, 16 g, Oral, PRN    glucose, 24 g, Oral, PRN    HYDROcodone-acetaminophen, 1 tablet, Oral, Q6H PRN    magnesium oxide, 800 mg, Oral, PRN    magnesium oxide, 800 mg, Oral, PRN    morphine, 2 mg, Intravenous, Q6H PRN    naloxone, 0.02 mg, Intravenous, PRN    ondansetron, 4 mg, Intravenous, Q6H PRN    potassium bicarbonate, 35 mEq, Oral, PRN    potassium bicarbonate, 50 mEq, Oral, PRN    potassium bicarbonate, 60 mEq, Oral, PRN    prochlorperazine, 5 mg, Intravenous, Q6H PRN    senna-docusate 8.6-50 mg, 1 tablet, Oral, BID PRN    sodium chloride 0.9%, 10 mL, Intravenous, Q12H PRN    trazodone, 100 mg, Oral, Nightly PRN    Pharmacy to dose Vancomycin consult, , , Once **AND** vancomycin - pharmacy to dose, , Intravenous, pharmacy to manage frequency    Review of patient's allergies indicates:  No Known Allergies     Past Medical History:   Diagnosis Date    Obese      No past surgical history on file.    Family History    None       Tobacco Use    Smoking status: Never    Smokeless tobacco: Not on file   Substance and Sexual Activity    Alcohol use: Yes     Alcohol/week: 6.0 standard drinks of alcohol     Types: 6 Cans of beer per week     Comment: daily    Drug use: Not on file    Sexual activity: Not on file     Review of Systems  Objective:     Vital Signs  (Most Recent):  Temp: 98.8 °F (37.1 °C) (06/25/24 0400)  Pulse: 70 (06/25/24 0659)  Resp: 16 (06/25/24 0400)  BP: (!) 150/76 (06/25/24 0400)  SpO2: 95 % (06/25/24 0659) Vital Signs (24h Range):  Temp:  [98.7 °F (37.1 °C)-99.1 °F (37.3 °C)] 98.8 °F (37.1 °C)  Pulse:  [67-87] 70  Resp:  [16-20] 16  SpO2:  [91 %-97 %] 95 %  BP: (135-150)/(64-87) 150/76     Weight: 113.9 kg (251 lb)  Body mass index is 38.16 kg/m².    Foot Exam    Right Foot/Ankle     Neurovascular  Dorsalis pedis: 2+  Posterior tibial: 2+  Saphenous nerve sensation: absent  Tibial nerve sensation: absent  Superficial peroneal nerve sensation: absent  Deep peroneal nerve sensation: absent  Sural nerve sensation: absent      Left Foot/Ankle      Neurovascular  Dorsalis pedis: 2+  Posterior tibial: 2+  Saphenous nerve sensation: absent  Tibial nerve sensation: absent  Superficial peroneal nerve sensation: absent  Deep peroneal nerve sensation: absent  Sural nerve sensation: absent                        Laboratory:  All pertinent labs reviewed within the last 24 hours.    Diagnostic Results:  I have reviewed all pertinent imaging results/findings within the past 24 hours.

## 2024-06-25 NOTE — SUBJECTIVE & OBJECTIVE
Scheduled Meds:   ceFEPime IV (PEDS and ADULTS)  2 g Intravenous Q8H    clindamycin IV (PEDS and ADULTS)  900 mg Intravenous Q8H    metronidazole  500 mg Intravenous Q8H    vancomycin (VANCOCIN) IV (PEDS and ADULTS)  1,750 mg Intravenous Q12H     Continuous Infusions:   0.9% NaCl   Intravenous Continuous   Stopped at 06/25/24 1443     PRN Meds:  Current Facility-Administered Medications:     acetaminophen, 650 mg, Oral, Q4H PRN    aluminum-magnesium hydroxide-simethicone, 30 mL, Oral, QID PRN    dextrose 10%, 12.5 g, Intravenous, PRN    dextrose 10%, 25 g, Intravenous, PRN    glucagon (human recombinant), 1 mg, Intramuscular, PRN    glucose, 16 g, Oral, PRN    glucose, 24 g, Oral, PRN    HYDROcodone-acetaminophen, 1 tablet, Oral, Q6H PRN    magnesium oxide, 800 mg, Oral, PRN    magnesium oxide, 800 mg, Oral, PRN    morphine, 2 mg, Intravenous, Q6H PRN    naloxone, 0.02 mg, Intravenous, PRN    ondansetron, 4 mg, Intravenous, Q6H PRN    potassium bicarbonate, 35 mEq, Oral, PRN    potassium bicarbonate, 50 mEq, Oral, PRN    potassium bicarbonate, 60 mEq, Oral, PRN    prochlorperazine, 5 mg, Intravenous, Q6H PRN    senna-docusate 8.6-50 mg, 1 tablet, Oral, BID PRN    sodium chloride 0.9%, 10 mL, Intravenous, Q12H PRN    trazodone, 100 mg, Oral, Nightly PRN    Pharmacy to dose Vancomycin consult, , , Once **AND** vancomycin - pharmacy to dose, , Intravenous, pharmacy to manage frequency    Review of patient's allergies indicates:  No Known Allergies     Past Medical History:   Diagnosis Date    Obese      No past surgical history on file.    Family History    None       Tobacco Use    Smoking status: Never    Smokeless tobacco: Not on file   Substance and Sexual Activity    Alcohol use: Yes     Alcohol/week: 6.0 standard drinks of alcohol     Types: 6 Cans of beer per week     Comment: daily    Drug use: Not on file    Sexual activity: Not on file     Review of Systems  Objective:     Vital Signs (Most Recent):  Temp:  98.8 °F (37.1 °C) (06/25/24 0400)  Pulse: 70 (06/25/24 0659)  Resp: 16 (06/25/24 0400)  BP: (!) 150/76 (06/25/24 0400)  SpO2: 95 % (06/25/24 0659) Vital Signs (24h Range):  Temp:  [98.7 °F (37.1 °C)-99.1 °F (37.3 °C)] 98.8 °F (37.1 °C)  Pulse:  [67-87] 70  Resp:  [16-20] 16  SpO2:  [91 %-97 %] 95 %  BP: (135-150)/(64-87) 150/76     Weight: 113.9 kg (251 lb)  Body mass index is 38.16 kg/m².    Foot Exam    Right Foot/Ankle     Neurovascular  Dorsalis pedis: 2+  Posterior tibial: 2+  Saphenous nerve sensation: absent  Tibial nerve sensation: absent  Superficial peroneal nerve sensation: absent  Deep peroneal nerve sensation: absent  Sural nerve sensation: absent      Left Foot/Ankle      Neurovascular  Dorsalis pedis: 2+  Posterior tibial: 2+  Saphenous nerve sensation: absent  Tibial nerve sensation: absent  Superficial peroneal nerve sensation: absent  Deep peroneal nerve sensation: absent  Sural nerve sensation: absent                        Laboratory:  All pertinent labs reviewed within the last 24 hours.    Diagnostic Results:  I have reviewed all pertinent imaging results/findings within the past 24 hours.  .

## 2024-06-25 NOTE — PLAN OF CARE
Problem: Adult Inpatient Plan of Care  Goal: Plan of Care Review  6/25/2024 0431 by Allyn Duncan RN  Outcome: Progressing  6/25/2024 0430 by Allyn Duncan RN  Outcome: Progressing  Goal: Patient-Specific Goal (Individualized)  6/25/2024 0431 by Allyn Duncan RN  Outcome: Progressing  6/25/2024 0430 by Alyln Duncan RN  Outcome: Progressing  Goal: Absence of Hospital-Acquired Illness or Injury  6/25/2024 0431 by Allyn Duncan RN  Outcome: Progressing  6/25/2024 0430 by Allyn Duncan RN  Outcome: Progressing  Goal: Optimal Comfort and Wellbeing  6/25/2024 0431 by Allyn Duncan RN  Outcome: Progressing  6/25/2024 0430 by Allyn Duncan RN  Outcome: Progressing  Goal: Readiness for Transition of Care  6/25/2024 0431 by Allyn Duncan RN  Outcome: Progressing  6/25/2024 0430 by Allyn Duncan RN  Outcome: Progressing     Problem: Infection  Goal: Absence of Infection Signs and Symptoms  Outcome: Progressing     Problem: Wound  Goal: Optimal Coping  Outcome: Progressing  Goal: Optimal Functional Ability  Outcome: Progressing  Goal: Absence of Infection Signs and Symptoms  Outcome: Progressing  Goal: Improved Oral Intake  Outcome: Progressing  Goal: Optimal Pain Control and Function  Outcome: Progressing  Goal: Skin Health and Integrity  Outcome: Progressing  Goal: Optimal Wound Healing  Outcome: Progressing

## 2024-06-25 NOTE — H&P
[facility] Medicine  History & Physical    Patient Name: Salvador Bermudez  MRN: [id number]  Patient Class: IP- Inpatient  Admission Date: 6/24/2024  Attending Physician: Anna Dickens MD   Primary Care Provider: [facility] Primary Doctor         Patient information was obtained from patient, past medical records, and ER records.     Subjective:     Principal Problem:Cellulitis of left foot    Chief Complaint:   Chief Complaint   Patient presents with    Wound Check     Left foot wound, started 3 months ago after wearing a new pair of shoes on a cruise.         HPI: Salvador Bermudez is a 54 year old male with a past medical history of obesity who presents with complaints of left foot wound which started 3 months ago however worsening pain, swelling, and redness over the past 3 days. He reports 3 months ago he had a blister to the left foot after wearing a pair of new shoes while on a cruise. He began treatment with wound care however his left foot is more swollen.  ED workup:  Left foot x-ray concerning for possible bony involvement at the base of the left metatarsal.  CBC and BMP unremarkable. Lactic acid 1.2 and  ESR  pending. Started on vancomycin in ED. Left foot MRI pending. Podiatry consult placed.  Admitted to hospital medicine for further management and treatment.                        Past Medical History:   Diagnosis Date    Obese        No past surgical history on file.    Review of patient's allergies indicates:  No Known Allergies    No current facility-administered medications on file prior to encounter.     Current Outpatient Medications on File Prior to Encounter   Medication Sig    aspirin-acetaminophen-caffeine 250-250-65 mg (EXCEDRIN EXTRA STRENGTH) 250-250-65 mg per tablet Take 1 tablet by mouth every 6 (six) hours as needed for Pain.    UNKNOWN TO PATIENT Take 1 tablet by mouth as needed. PATIENT'S WIFE BACK MEDICATION     Family History    None       Tobacco Use    Smoking  status: Never    Smokeless tobacco: Not on file   Substance and Sexual Activity    Alcohol use: Yes     Alcohol/week: 6.0 standard drinks of alcohol     Types: 6 Cans of beer per week     Comment: daily    Drug use: Not on file    Sexual activity: Not on file     Review of Systems   Constitutional:  Negative for activity change, appetite change, chills, diaphoresis, fatigue and fever.   Respiratory:  Negative for cough and shortness of breath.    Cardiovascular:  Negative for chest pain.   Gastrointestinal:  Negative for abdominal distention, abdominal pain and nausea.   Genitourinary:  Negative for difficulty urinating.   Musculoskeletal:  Positive for myalgias. Negative for back pain.   Neurological:  Negative for dizziness, facial asymmetry, light-headedness, numbness and headaches.     Objective:     Vital Signs (Most Recent):  Temp: 98.7 °F (37.1 °C) (06/25/24 0330)  Pulse: 67 (06/25/24 0330)  Resp: 20 (06/24/24 2212)  BP: 135/64 (06/25/24 0330)  SpO2: (!) 94 % (06/25/24 0330) Vital Signs (24h Range):  Temp:  [98.7 °F (37.1 °C)-99.1 °F (37.3 °C)] 98.7 °F (37.1 °C)  Pulse:  [67-87] 67  Resp:  [20] 20  SpO2:  [94 %-97 %] 94 %  BP: (135-139)/(64-87) 135/64     Weight: 111.1 kg (244 lb 14.9 oz)  Body mass index is 37.24 kg/m².     Physical Exam  Vitals and nursing note reviewed.   Constitutional:       Appearance: He is obese. He is not ill-appearing.   Eyes:      Pupils: Pupils are equal, round, and reactive to light.   Cardiovascular:      Rate and Rhythm: Normal rate and regular rhythm.      Pulses: Normal pulses.           Dorsalis pedis pulses are 2+ on the left side.        Posterior tibial pulses are 2+ on the left side.      Heart sounds: Normal heart sounds.   Pulmonary:      Effort: Pulmonary effort is normal. No respiratory distress.      Breath sounds: Normal breath sounds. No wheezing.   Abdominal:      General: Bowel sounds are normal. There is no distension.      Palpations: Abdomen is soft.    Musculoskeletal:      Left lower leg: Edema present.   Feet:      Left foot:      Skin integrity: Erythema, warmth and dry skin present.      Comments: Left foot dressing cdi with foot boot  Skin:     General: Skin is warm and dry.      Capillary Refill: Capillary refill takes less than 2 seconds.      Findings: Erythema and wound (left foot) present.   Neurological:      Mental Status: He is alert and oriented to person, place, and time. Mental status is at baseline.      GCS: GCS eye subscore is 4. GCS verbal subscore is 5. GCS motor subscore is 6.              CRANIAL NERVES     CN III, IV, VI   Pupils are equal, round, and reactive to light.       Significant Labs: All pertinent labs within the past 24 hours have been reviewed.    Bilirubin:   Recent Labs   Lab 06/25/24  0027   BILITOT 0.5       BMP:   Recent Labs   Lab 06/25/24  0027   *      K 4.0      CO2 26   BUN 16   CREATININE 0.9   CALCIUM 9.6     CBC:   Recent Labs   Lab 06/25/24  0027   WBC 7.19   HGB 14.2   HCT 42.9        CMP:   Recent Labs   Lab 06/25/24  0027      K 4.0      CO2 26   *   BUN 16   CREATININE 0.9   CALCIUM 9.6   PROT 7.5   ALBUMIN 3.2*   BILITOT 0.5   ALKPHOS 59   AST 29   ALT 36   ANIONGAP 11       Lactic Acid:   Recent Labs   Lab 06/25/24  0027   LACTATE 1.2       POCT Glucose:   Recent Labs   Lab 06/24/24  2251   POCTGLUCOSE 118*       Urine Studies:   Recent Labs   Lab 06/25/24  0044   COLORU Yellow   APPEARANCEUA Clear   PHUR 6.0   SPECGRAV >1.030*   PROTEINUA 1+*   GLUCUA 3+*   KETONESU Negative   BILIRUBINUA Negative   OCCULTUA Negative   NITRITE Negative   UROBILINOGEN Negative   LEUKOCYTESUR 1+*   RBCUA 2   WBCUA 38*   BACTERIA None   SQUAMEPITHEL 1   HYALINECASTS 1       Significant Imaging: I have reviewed all pertinent imaging results/findings within the past 24 hours.  Assessment/Plan:     * Cellulitis of left foot  -Podiatry Consult  -wound care consult  -MRI pending  -blood  culture pending  -started on Vancomycin  -analgesic prn  -monitor cbc  -inflammatory markers pending      Obesity (BMI 35.0-39.9 without comorbidity)  Body mass index is 37.24 kg/m². Morbid obesity complicates all aspects of disease management from diagnostic modalities to treatment. Weight loss encouraged and health benefits explained to patient.           VTE Risk Mitigation (From admission, onward)           Ordered     IP VTE HIGH RISK PATIENT  Once         06/25/24 0143     Place sequential compression device  Until discontinued         06/25/24 0143                               Pharmacokinetic Initial Assessment: IV Vancomycin    Assessment/Plan:    Initiate intravenous vancomycin with loading dose of 1750 mg once followed by a maintenance dose of vancomycin 1750 mg IV every 12 hours  Desired empiric serum trough concentration is 10 to 15 mcg/mL  Draw vancomycin trough level 60 min prior to fourth dose on 6/26 at approximately 1200  Pharmacy will continue to follow and monitor vancomycin.      Please contact pharmacy at extension [phone] with any questions regarding this assessment.     Thank you for the consult,   Rere Underwood       Patient brief summary:  Salvador Bermudez is a 54 y.o. male initiated on antimicrobial therapy with IV Vancomycin for treatment of suspected skin & soft tissue infection    Drug Allergies:   Review of patient's allergies indicates:  No Known Allergies    Actual Body Weight:   111.1 kg    Renal Function:   Estimated Creatinine Clearance: 116.1 mL/min (based on SCr of 0.9 mg/dL).,     Dialysis Method (if applicable):  N/A    CBC (last 72 hours):  Recent Labs   Lab Result Units 06/25/24  0027   WBC K/uL 7.19   Hemoglobin g/dL 14.2   Hematocrit % 42.9   Platelets K/uL 336   Gran % % 51.8   Lymph % % 29.5   Mono % % 12.8   Eosinophil % % 4.2   Basophil % % 1.0   Differential Method  Automated       Metabolic Panel (last 72 hours):  Recent Labs   Lab Result Units 06/25/24 0027  "06/25/24 0044   Sodium mmol/L 139  --    Potassium mmol/L 4.0  --    Chloride mmol/L 102  --    CO2 mmol/L 26  --    Glucose mg/dL 113*  --    Glucose, UA   --  3+*   BUN mg/dL 16  --    Creatinine mg/dL 0.9  --    Albumin g/dL 3.2*  --    Total Bilirubin mg/dL 0.5  --    Alkaline Phosphatase U/L 59  --    AST U/L 29  --    ALT U/L 36  --        Drug levels (last 3 results):  No results for input(s): "VANCOMYCINRA", "VANCORANDOM", "VANCOMYCINPE", "VANCOPEAK", "VANCOMYCINTR", "VANCOTROUGH" in the last 72 hours.    Microbiologic Results:  Microbiology Results (last 7 days)       Procedure Component Value Units Date/Time    Urine culture [[id number]] Collected: 06/25/24 0044    Order Status: No result Specimen: Urine Updated: 06/25/24 0115    Blood culture #1 **CANNOT BE ORDERED STAT** [[id number]] Collected: 06/25/24 0027    Order Status: Sent Specimen: Blood from Peripheral, Forearm, Right     Blood culture #2 **CANNOT BE ORDERED STAT** [[id number]] Collected: 06/25/24 0027    Order Status: Sent Specimen: Blood from Peripheral, Forearm, Left               Janine Vasquez DNP  Department of Hospital Medicine  [facility]          "

## 2024-06-25 NOTE — PT/OT/SLP EVAL
Physical Therapy Evaluation and Discharge Note    Patient Name:  Salvador Bermudez   MRN:  [id number]    Recommendations:     Discharge Recommendations: No Therapy Indicated  Discharge Equipment Recommendations: none   Barriers to discharge: None    Assessment:     Salvador Bermudez is a 54 y.o. male admitted with a medical diagnosis of Cellulitis of left foot. .  At this time, patient is functioning at their prior level of function and does not require further acute PT services.     Recent Surgery: * No surgery found *      Plan:     During this hospitalization, patient does not require further acute PT services.  Please re-consult if situation changes.      Subjective     Chief Complaint: wound on foot  Patient/Family Comments/goals: go home  Pain/Comfort:  Pain Rating 1: 1/10  Location - Side 1: Left  Location - Orientation 1: lower  Location 1: foot  Pain Addressed 1: Reposition, Cessation of Activity  Pain Rating Post-Intervention 1: 1/10    Patients cultural, spiritual, Buddhism conflicts given the current situation:      Living Environment:  Currently lives with spouse in 1 story home with 6 step entry.  Prior to admission, patients level of function was independent.  Equipment used at home: none.  DME owned (not currently used): none.  Upon discharge, patient will have assistance from family.    Objective:     Communicated with nurse prior to session.  Patient found supine with blood pressure cuff, pulse ox (continuous), telemetry upon PT entry to room.    General Precautions: Standard, fall    Orthopedic Precautions:    Braces:    Respiratory Status: Room air    Exams:  RLE ROM: WFL  RLE Strength: WNL  LLE ROM: WFL  LLE Strength: WNL    Functional Mobility:  Bed Mobility:     Supine to Sit: independence  Sit to Supine: independence  Transfers:     Sit to Stand:  independence with no AD  Gait: side step x 5 feet no AD supervision    AM-PAC 6 CLICK MOBILITY  Total Score:24       Treatment and Education:  None  given    AM-PAC 6 CLICK MOBILITY  Total Score:24     Patient left supine with call button in reach and nurse notified.    GOALS:   Multidisciplinary Problems       Physical Therapy Goals       Not on file                    History:     Past Medical History:   Diagnosis Date    Obese        No past surgical history on file.    Time Tracking:     PT Received On: 06/25/24  PT Start Time: 1033     PT Stop Time: 1045  PT Total Time (min): 12 min     Billable Minutes: Evaluation 12      06/25/2024

## 2024-06-25 NOTE — ED PROVIDER NOTES
Encounter Date: 6/24/2024       History     Chief Complaint   Patient presents with    Wound Check     Left foot wound, started 3 months ago after wearing a new pair of shoes on a cruise.      Patient presents emergency department reported ulcer to his left foot that has been ongoing for some time he was seen at urgent care and at wound care had some debridement is not placed on antibiotics over last few days the foot has become more swollen and now is significantly erythematous he has had increased pain there has been no fever he denies any history of diabetes states he does not typically go to doctors states the wound started when he wore some new water sandals on a cruise and he has been dealing with it ever since that time        Review of patient's allergies indicates:  No Known Allergies  Past Medical History:   Diagnosis Date    Obese      No past surgical history on file.  No family history on file.  Social History     Tobacco Use    Smoking status: Never   Substance Use Topics    Alcohol use: Yes     Alcohol/week: 6.0 standard drinks of alcohol     Types: 6 Cans of beer per week     Comment: daily     Review of Systems   Constitutional:  Negative for chills and fever.   Skin:  Positive for color change and wound.   All other systems reviewed and are negative.      Physical Exam     Initial Vitals [06/24/24 2212]   BP Pulse Resp Temp SpO2   139/87 87 20 99.1 °F (37.3 °C) 97 %      MAP       --         Physical Exam    Constitutional: He appears well-developed and well-nourished. No distress.   HENT:   Head: Normocephalic and atraumatic.   Right Ear: External ear normal.   Left Ear: External ear normal.   Mouth/Throat: Oropharynx is clear and moist.   Eyes: Pupils are equal, round, and reactive to light.   Neck: Neck supple.   Normal range of motion.  Cardiovascular:  Normal rate, regular rhythm, S1 normal, S2 normal, normal heart sounds and intact distal pulses.           Pulmonary/Chest: Breath sounds  normal.   Abdominal: Abdomen is soft. Bowel sounds are normal. There is no abdominal tenderness.   Musculoskeletal:         General: Normal range of motion.      Cervical back: Normal range of motion and neck supple.      Comments: Ulcer noted to the base of the left foot at the 5th MCP there is marked swelling noted to the lateral aspect of the left foot with denudation of the skin over the dorsum of the left foot there is erythema that extends up the dorsum of the left foot with significant swelling noted     Neurological: He is alert and oriented to person, place, and time. He has normal strength. GCS score is 15. GCS eye subscore is 4. GCS verbal subscore is 5. GCS motor subscore is 6.   Skin: Skin is warm and dry. No rash noted. There is erythema.   Ulcer and cellulitis as above   Psychiatric: He has a normal mood and affect. His behavior is normal.         ED Course   Procedures  Labs Reviewed   CBC W/ AUTO DIFFERENTIAL - Abnormal; Notable for the following components:       Result Value    RBC 4.54 (*)     MCH 31.3 (*)     Immature Granulocytes 0.7 (*)     Immature Grans (Abs) 0.05 (*)     All other components within normal limits   COMPREHENSIVE METABOLIC PANEL - Abnormal; Notable for the following components:    Glucose 113 (*)     Albumin 3.2 (*)     All other components within normal limits   URINALYSIS, REFLEX TO URINE CULTURE - Abnormal; Notable for the following components:    Specific Gravity, UA >1.030 (*)     Protein, UA 1+ (*)     Glucose, UA 3+ (*)     Leukocytes, UA 1+ (*)     All other components within normal limits    Narrative:     Specimen Source->Urine   URINALYSIS MICROSCOPIC - Abnormal; Notable for the following components:    WBC, UA 38 (*)     All other components within normal limits    Narrative:     Specimen Source->Urine   POCT GLUCOSE - Abnormal; Notable for the following components:    POCT Glucose 118 (*)     All other components within normal limits   CULTURE, BLOOD   CULTURE,  BLOOD   CULTURE, URINE   LACTIC ACID, PLASMA          Imaging Results              X-Ray Foot Complete Left (In process)                      Medications   vancomycin - pharmacy to dose (has no administration in time range)   vancomycin (VANCOCIN) 1,750 mg in D5W 500 mL IVPB (1,750 mg Intravenous New Bag 6/25/24 0050)     Medical Decision Making  Laboratory evaluation reviewed radiographs are concerning for possible bony involvement at the base of the left metatarsal I have discussed patient's findings with Ms.Fouchea SUN who will evaluate patient in the emergency department for admission    Amount and/or Complexity of Data Reviewed  Labs: ordered. Decision-making details documented in ED Course.  Radiology: ordered. Decision-making details documented in ED Course.    Risk  Decision regarding hospitalization.                                      Clinical Impression:  Final diagnoses:  [L97.509] Ulcer of foot  [L03.116] Cellulitis of left lower extremity (Primary)          ED Disposition Condition    Admit Stable                Tristian Henao MD  06/25/24 1239

## 2024-06-25 NOTE — HPI
Salvador Bermudez is a 54 year old male with a past medical history of obesity who presents with complaints of left foot wound which started 3 months ago however worsening pain, swelling, and redness over the past 3 days. He reports 3 months ago he had a blister to the left foot after wearing a pair of new shoes while on a cruise. He began treatment with wound care however his left foot is more swollen.  ED workup:  Left foot x-ray concerning for possible bony involvement at the base of the left metatarsal.  CBC and BMP unremarkable. Lactic acid 1.2 and  ESR  pending. Started on vancomycin in ED. Left foot MRI pending. Podiatry consult placed.  Admitted to hospital medicine for further management and treatment.

## 2024-06-25 NOTE — ASSESSMENT & PLAN NOTE
-Podiatry Consult  -wound care consult  -MRI pending  -blood culture pending  -started on Vancomycin  -analgesic prn  -monitor cbc  -inflammatory markers pending

## 2024-06-25 NOTE — HPI
""Salvador Bermudez is a 54 year old male with a past medical history of obesity who presents with complaints of left foot wound which started 3 months ago however worsening pain, swelling, and redness over the past 3 days. He reports 3 months ago he had a blister to the left foot after wearing a pair of new shoes while on a cruise. He began treatment with wound care however his left foot is more swollen.  ED workup:  Left foot x-ray concerning for possible bony involvement at the base of the left metatarsal.  CBC and BMP unremarkable. Lactic acid 1.2 and  ESR  pending. Started on vancomycin in ED. Left foot MRI pending. Podiatry consult placed.  Admitted to hospital medicine for further management and treatment."    Patient said he has been treated outpatient for the wound by [facility].      Patient had arterial ultrasounds.   Impression:  No evidence for significant lower extremity arterial obstructive disease.        Podiatry was consulted.   "

## 2024-06-25 NOTE — PT/OT/SLP EVAL
Occupational Therapy   Evaluation and Discharge Note    Name: Salvador Bermudez  MRN: [id number]  Admitting Diagnosis: Cellulitis of left foot  Recent Surgery: * No surgery found *      Recommendations:     Discharge Recommendations: No Therapy Indicated  Discharge Equipment Recommendations: none  Barriers to discharge:  None    Assessment:     Salvador Bermudez is a 54 y.o. male with a medical diagnosis of Cellulitis of left foot. At this time, patient is modified independent with ADLs. Patient does not require further acute OT services.     Plan:     During this hospitalization, patient does not require further acute OT services.  Please re-consult if situation changes.    Plan of Care Reviewed with: patient    Subjective     Chief Complaint: none  Patient/Family Comments/goals: none    Occupational Profile:  Living Environment: Patient lives with wife in a raised [facility] with 6 FELIZ through garage entrance.   Previous level of function: Patient was independent with ADLs and mobility.   Equipment Used at home: none  Assistance upon Discharge: Patient will receive assistance from wife if needed.     Pain/Comfort:  Pain Rating 1: 1/10  Location - Side 1: Left  Location - Orientation 1: lower  Location 1: foot  Pain Addressed 1: Reposition, Distraction  Pain Rating Post-Intervention 1: 1/10    Patients cultural, spiritual, Lutheran conflicts given the current situation: no    Objective:     Communicated with: nurse Alcantar prior to session.  Patient found HOB elevated with blood pressure cuff, pulse ox (continuous), telemetry upon OT entry to room.    General Precautions: Standard, fall  Orthopedic Precautions: N/A  Braces: N/A  Respiratory Status: Room air     Occupational Performance:    Bed Mobility:    Patient completed Scooting/Bridging with modified independence  Patient completed Supine to Sit with modified independence  Patient completed Sit to Supine with modified independence    Functional Mobility/Transfers:  Patient  completed Sit <> Stand Transfer with supervision  with  no assistive device     Activities of Daily Living:  Grooming: modified independence with oral and facial hygiene standing at sink  Lower Body Dressing: modified independence     Cognitive/Visual Perceptual:  Cognitive/Psychosocial Skills:     -       Oriented to: x4   -       Follows Commands/attention:Follows multistep  commands  -       Communication: clear/fluent  -       Safety awareness/insight to disability: intact   -       Mood/Affect/Coping skills/emotional control: Appropriate to situation and Cooperative  Visual/Perceptual:      -Intact     Physical Exam:  Postural examination/scapula alignment:    -       Rounded shoulders  -       Forward head  Upper Extremity Range of Motion:     -       Right Upper Extremity: WFL  -       Left Upper Extremity: WFL  Upper Extremity Strength:    -       Right Upper Extremity: WFL  -       Left Upper Extremity: WFL   Strength:    -       Right Upper Extremity: WFL  -       Left Upper Extremity: WFL  Fine Motor Coordination:    -       Intact  Gross motor coordination:   WFL    AMPAC 6 Click ADL:  AMPAC Total Score: 24    Treatment & Education:  OT ed pt on OT role & POC as well as discharge recommendations.      Patient left HOB elevated with all lines intact, call button in reach, and nurse notified    GOALS:   Multidisciplinary Problems       Occupational Therapy Goals       Not on file                    History:     Past Medical History:   Diagnosis Date    Obese        No past surgical history on file.    Time Tracking:     OT Date of Treatment: 06/25/24  OT Start Time: 0928  OT Stop Time: 0941  OT Total Time (min): 13 min    Billable Minutes:Evaluation 5  Self Care/Home Management 8    6/25/2024

## 2024-06-25 NOTE — CONSULTS
"[facility] - ED  Podiatry  Consult Note    Patient Name: Salvador Bermudez  MRN: [id number]  Admission Date: 6/24/2024  Hospital Length of Stay: 0 days  Attending Physician: Aníbal Morrison Jr., MD  Primary Care Provider: Lyubov, Primary Doctor     Consults  Subjective:     History of Present Illness:  "Salvador Bermudez is a 54 year old male with a past medical history of obesity who presents with complaints of left foot wound which started 3 months ago however worsening pain, swelling, and redness over the past 3 days. He reports 3 months ago he had a blister to the left foot after wearing a pair of new shoes while on a cruise. He began treatment with wound care however his left foot is more swollen.  ED workup:  Left foot x-ray concerning for possible bony involvement at the base of the left metatarsal.  CBC and BMP unremarkable. Lactic acid 1.2 and  ESR  pending. Started on vancomycin in ED. Left foot MRI pending. Podiatry consult placed.  Admitted to hospital medicine for further management and treatment."    Patient said he has been treated outpatient for the wound by [facility].      Patient had arterial ultrasounds.   Impression:  No evidence for significant lower extremity arterial obstructive disease.        Podiatry was consulted.     Scheduled Meds:   ceFEPime IV (PEDS and ADULTS)  2 g Intravenous Q8H    clindamycin IV (PEDS and ADULTS)  900 mg Intravenous Q8H    metronidazole  500 mg Intravenous Q8H    vancomycin (VANCOCIN) IV (PEDS and ADULTS)  1,750 mg Intravenous Q12H     Continuous Infusions:   0.9% NaCl   Intravenous Continuous   Stopped at 06/25/24 1443     PRN Meds:  Current Facility-Administered Medications:     acetaminophen, 650 mg, Oral, Q4H PRN    aluminum-magnesium hydroxide-simethicone, 30 mL, Oral, QID PRN    dextrose 10%, 12.5 g, Intravenous, PRN    dextrose 10%, 25 g, Intravenous, PRN    glucagon (human recombinant), 1 mg, Intramuscular, PRN    glucose, 16 g, Oral, PRN    glucose, 24 " g, Oral, PRN    HYDROcodone-acetaminophen, 1 tablet, Oral, Q6H PRN    magnesium oxide, 800 mg, Oral, PRN    magnesium oxide, 800 mg, Oral, PRN    morphine, 2 mg, Intravenous, Q6H PRN    naloxone, 0.02 mg, Intravenous, PRN    ondansetron, 4 mg, Intravenous, Q6H PRN    potassium bicarbonate, 35 mEq, Oral, PRN    potassium bicarbonate, 50 mEq, Oral, PRN    potassium bicarbonate, 60 mEq, Oral, PRN    prochlorperazine, 5 mg, Intravenous, Q6H PRN    senna-docusate 8.6-50 mg, 1 tablet, Oral, BID PRN    sodium chloride 0.9%, 10 mL, Intravenous, Q12H PRN    trazodone, 100 mg, Oral, Nightly PRN    Pharmacy to dose Vancomycin consult, , , Once **AND** vancomycin - pharmacy to dose, , Intravenous, pharmacy to manage frequency    Review of patient's allergies indicates:  No Known Allergies     Past Medical History:   Diagnosis Date    Obese      No past surgical history on file.    Family History    None       Tobacco Use    Smoking status: Never    Smokeless tobacco: Not on file   Substance and Sexual Activity    Alcohol use: Yes     Alcohol/week: 6.0 standard drinks of alcohol     Types: 6 Cans of beer per week     Comment: daily    Drug use: Not on file    Sexual activity: Not on file     Review of Systems  Objective:     Vital Signs (Most Recent):  Temp: 98.8 °F (37.1 °C) (06/25/24 0400)  Pulse: 70 (06/25/24 0659)  Resp: 16 (06/25/24 0400)  BP: (!) 150/76 (06/25/24 0400)  SpO2: 95 % (06/25/24 0659) Vital Signs (24h Range):  Temp:  [98.7 °F (37.1 °C)-99.1 °F (37.3 °C)] 98.8 °F (37.1 °C)  Pulse:  [67-87] 70  Resp:  [16-20] 16  SpO2:  [91 %-97 %] 95 %  BP: (135-150)/(64-87) 150/76     Weight: 113.9 kg (251 lb)  Body mass index is 38.16 kg/m².    Foot Exam    Right Foot/Ankle     Neurovascular  Dorsalis pedis: 2+  Posterior tibial: 2+  Saphenous nerve sensation: absent  Tibial nerve sensation: absent  Superficial peroneal nerve sensation: absent  Deep peroneal nerve sensation: absent  Sural nerve sensation: absent      Left  Foot/Ankle      Neurovascular  Dorsalis pedis: 2+  Posterior tibial: 2+  Saphenous nerve sensation: absent  Tibial nerve sensation: absent  Superficial peroneal nerve sensation: absent  Deep peroneal nerve sensation: absent  Sural nerve sensation: absent                        Laboratory:  All pertinent labs reviewed within the last 24 hours.    Diagnostic Results:  I have reviewed all pertinent imaging results/findings within the past 24 hours.  .  Assessment/Plan:     ID  * Cellulitis of left foot  Culture taken of wound- Infectious disease following.     Awaiting MRI results.     Discussed with patient treatment options in details. Patient understands the risks and benefits and alternate treatment options.     Discussed with patient that given the severity of the infection in his foot, I recommend partial foot amputation in order to remove the infected portion of his foot.   Patient is agreeable to partial foot amputation- awaiting MRI results to determine the level of amputation necessary.     Will plan to take patient to the OR at some point this week    Patient will need to be non weight bearing following partial foot amputation until the sutures are removed. Discussed this with patient.      Counseled patient on increasing protein intake, not getting wound wet, keeping dressing clean dry and intact, following a healthy diet, elevating legs when able, removing pressure from wound             Thank you for your consult. I will follow-up with patient. Please contact us if you have any additional questions.    Trinity Green DPM  Podiatry  [facility]

## 2024-06-25 NOTE — NURSING
Nurses Note -- 4 Eyes      6/25/2024   6:54 PM      Skin assessed during: Admit      [] No Altered Skin Integrity Present    []Prevention Measures Documented      [x] Yes- Altered Skin Integrity Present or Discovered   [x] LDA Added if Not in Epic (Describe Wound)   [x] New Altered Skin Integrity was Present on Admit and Documented in LDA   [x] Wound Image Taken    Wound Care Consulted? Yes    Attending Nurse:  JESSIE Fonseca     Second RN/Staff Member: DIONICIO Clifford

## 2024-06-25 NOTE — PROGRESS NOTES
Subjective/Objective  Scheduled Meds:   ceFEPime IV (PEDS and ADULTS)  2 g Intravenous Q8H    clindamycin IV (PEDS and ADULTS)  900 mg Intravenous Q8H    metronidazole  500 mg Intravenous Q8H    vancomycin (VANCOCIN) IV (PEDS and ADULTS)  1,750 mg Intravenous Q12H     Continuous Infusions:   0.9% NaCl   Intravenous Continuous 100 mL/hr at 06/25/24 0348 New Bag at 06/25/24 0348     PRN Meds:  Current Facility-Administered Medications:     acetaminophen, 650 mg, Oral, Q4H PRN    aluminum-magnesium hydroxide-simethicone, 30 mL, Oral, QID PRN    dextrose 10%, 12.5 g, Intravenous, PRN    dextrose 10%, 25 g, Intravenous, PRN    glucagon (human recombinant), 1 mg, Intramuscular, PRN    glucose, 16 g, Oral, PRN    glucose, 24 g, Oral, PRN    HYDROcodone-acetaminophen, 1 tablet, Oral, Q6H PRN    magnesium oxide, 800 mg, Oral, PRN    magnesium oxide, 800 mg, Oral, PRN    morphine, 2 mg, Intravenous, Q6H PRN    naloxone, 0.02 mg, Intravenous, PRN    ondansetron, 4 mg, Intravenous, Q6H PRN    potassium bicarbonate, 35 mEq, Oral, PRN    potassium bicarbonate, 50 mEq, Oral, PRN    potassium bicarbonate, 60 mEq, Oral, PRN    prochlorperazine, 5 mg, Intravenous, Q6H PRN    senna-docusate 8.6-50 mg, 1 tablet, Oral, BID PRN    sodium chloride 0.9%, 10 mL, Intravenous, Q12H PRN    trazodone, 100 mg, Oral, Nightly PRN    Pharmacy to dose Vancomycin consult, , , Once **AND** vancomycin - pharmacy to dose, , Intravenous, pharmacy to manage frequency    Review of patient's allergies indicates:  No Known Allergies     Past Medical History:   Diagnosis Date    Obese      No past surgical history on file.    Family History    None       Tobacco Use    Smoking status: Never    Smokeless tobacco: Not on file   Substance and Sexual Activity    Alcohol use: Yes     Alcohol/week: 6.0 standard drinks of alcohol     Types: 6 Cans of beer per week     Comment: daily    Drug use: Not on file    Sexual activity: Not on file     Review of  Systems  Objective:     Vital Signs (Most Recent):  Temp: 98.8 °F (37.1 °C) (06/25/24 0400)  Pulse: 70 (06/25/24 0659)  Resp: 16 (06/25/24 0400)  BP: (!) 150/76 (06/25/24 0400)  SpO2: 95 % (06/25/24 0659) Vital Signs (24h Range):  Temp:  [98.7 °F (37.1 °C)-99.1 °F (37.3 °C)] 98.8 °F (37.1 °C)  Pulse:  [67-87] 70  Resp:  [16-20] 16  SpO2:  [91 %-97 %] 95 %  BP: (135-150)/(64-87) 150/76     Weight: 113.9 kg (251 lb)  Body mass index is 38.16 kg/m².    Foot Exam    Right Foot/Ankle     Neurovascular  Dorsalis pedis: 2+  Posterior tibial: 2+  Saphenous nerve sensation: absent  Tibial nerve sensation: absent  Superficial peroneal nerve sensation: absent  Deep peroneal nerve sensation: absent  Sural nerve sensation: absent      Left Foot/Ankle      Neurovascular  Dorsalis pedis: 2+  Posterior tibial: 2+  Saphenous nerve sensation: absent  Tibial nerve sensation: absent  Superficial peroneal nerve sensation: absent  Deep peroneal nerve sensation: absent  Sural nerve sensation: absent                        Laboratory:  All pertinent labs reviewed within the last 24 hours.    Diagnostic Results:  I have reviewed all pertinent imaging results/findings within the past 24 hours.    Scheduled Meds:   ceFEPime IV (PEDS and ADULTS)  2 g Intravenous Q8H    clindamycin IV (PEDS and ADULTS)  900 mg Intravenous Q8H    metronidazole  500 mg Intravenous Q8H    vancomycin (VANCOCIN) IV (PEDS and ADULTS)  1,750 mg Intravenous Q12H     Continuous Infusions:   0.9% NaCl   Intravenous Continuous   Stopped at 06/25/24 1443     PRN Meds:  Current Facility-Administered Medications:     acetaminophen, 650 mg, Oral, Q4H PRN    aluminum-magnesium hydroxide-simethicone, 30 mL, Oral, QID PRN    dextrose 10%, 12.5 g, Intravenous, PRN    dextrose 10%, 25 g, Intravenous, PRN    glucagon (human recombinant), 1 mg, Intramuscular, PRN    glucose, 16 g, Oral, PRN    glucose, 24 g, Oral, PRN    HYDROcodone-acetaminophen, 1 tablet, Oral, Q6H PRN     magnesium oxide, 800 mg, Oral, PRN    magnesium oxide, 800 mg, Oral, PRN    morphine, 2 mg, Intravenous, Q6H PRN    naloxone, 0.02 mg, Intravenous, PRN    ondansetron, 4 mg, Intravenous, Q6H PRN    potassium bicarbonate, 35 mEq, Oral, PRN    potassium bicarbonate, 50 mEq, Oral, PRN    potassium bicarbonate, 60 mEq, Oral, PRN    prochlorperazine, 5 mg, Intravenous, Q6H PRN    senna-docusate 8.6-50 mg, 1 tablet, Oral, BID PRN    sodium chloride 0.9%, 10 mL, Intravenous, Q12H PRN    trazodone, 100 mg, Oral, Nightly PRN    Pharmacy to dose Vancomycin consult, , , Once **AND** vancomycin - pharmacy to dose, , Intravenous, pharmacy to manage frequency    Vital signs in last 24 hours:  Temp:  [98.7 °F (37.1 °C)-99.1 °F (37.3 °C)] 98.8 °F (37.1 °C)  Pulse:  [67-87] 70  Resp:  [16-20] 16  SpO2:  [91 %-97 %] 95 %  BP: (135-150)/(64-87) 150/76    Intake/Output last 3 shifts:  I/O last 3 completed shifts:  In: -   Out: 800 [Urine:800]  Intake/Output this shift:  No intake/output data recorded.    Problem Assessment/Plan  ID  * Cellulitis of left foot  Assessment & Plan  Culture taken of wound- Infectious disease following.     Awaiting MRI results.     Discussed with patient treatment options in details. Patient understands the risks and benefits and alternate treatment options.     Discussed with patient that given the severity of the infection in his foot, I recommend partial foot amputation in order to remove the infected portion of his foot.   Patient is agreeable to partial foot amputation- awaiting MRI results to determine the level of amputation necessary.     Will plan to take patient to the OR at some point this week    Patient will need to be non weight bearing following partial foot amputation until the sutures are removed. Discussed this with patient.      Counseled patient on increasing protein intake, not getting wound wet, keeping dressing clean dry and intact, following a healthy diet, elevating legs when able,  removing pressure from wound

## 2024-06-25 NOTE — PROGRESS NOTES
No acute events overnight.  Patient doing okay.  Pending MRI and Podiatry recommendations.  We will ask ID to see patient    Aníbal Morrison MD

## 2024-06-25 NOTE — PROGRESS NOTES
"Pharmacokinetic Initial Assessment: IV Vancomycin    Assessment/Plan:    Initiate intravenous vancomycin with loading dose of 1750 mg once followed by a maintenance dose of vancomycin 1750 mg IV every 12 hours  Desired empiric serum trough concentration is 10 to 15 mcg/mL  Draw vancomycin trough level 60 min prior to fourth dose on 6/26 at approximately 1200  Pharmacy will continue to follow and monitor vancomycin.      Please contact pharmacy at extension [phone] with any questions regarding this assessment.     Thank you for the consult,   Rere Underwood       Patient brief summary:  Salvador Bermudez is a 54 y.o. male initiated on antimicrobial therapy with IV Vancomycin for treatment of suspected skin & soft tissue infection    Drug Allergies:   Review of patient's allergies indicates:  No Known Allergies    Actual Body Weight:   111.1 kg    Renal Function:   Estimated Creatinine Clearance: 116.1 mL/min (based on SCr of 0.9 mg/dL).,     Dialysis Method (if applicable):  N/A    CBC (last 72 hours):  Recent Labs   Lab Result Units 06/25/24  0027   WBC K/uL 7.19   Hemoglobin g/dL 14.2   Hematocrit % 42.9   Platelets K/uL 336   Gran % % 51.8   Lymph % % 29.5   Mono % % 12.8   Eosinophil % % 4.2   Basophil % % 1.0   Differential Method  Automated       Metabolic Panel (last 72 hours):  Recent Labs   Lab Result Units 06/25/24  0027 06/25/24  0044   Sodium mmol/L 139  --    Potassium mmol/L 4.0  --    Chloride mmol/L 102  --    CO2 mmol/L 26  --    Glucose mg/dL 113*  --    Glucose, UA   --  3+*   BUN mg/dL 16  --    Creatinine mg/dL 0.9  --    Albumin g/dL 3.2*  --    Total Bilirubin mg/dL 0.5  --    Alkaline Phosphatase U/L 59  --    AST U/L 29  --    ALT U/L 36  --        Drug levels (last 3 results):  No results for input(s): "VANCOMYCINRA", "VANCORANDOM", "VANCOMYCINPE", "VANCOPEAK", "VANCOMYCINTR", "VANCOTROUGH" in the last 72 hours.    Microbiologic Results:  Microbiology Results (last 7 days)       Procedure " Component Value Units Date/Time    Urine culture [[id number]] Collected: 06/25/24 0044    Order Status: No result Specimen: Urine Updated: 06/25/24 0115    Blood culture #1 **CANNOT BE ORDERED STAT** [[id number]] Collected: 06/25/24 0027    Order Status: Sent Specimen: Blood from Peripheral, Forearm, Right     Blood culture #2 **CANNOT BE ORDERED STAT** [[id number]] Collected: 06/25/24 0027    Order Status: Sent Specimen: Blood from Peripheral, Forearm, Left

## 2024-06-25 NOTE — SUBJECTIVE & OBJECTIVE
Past Medical History:   Diagnosis Date    Obese        No past surgical history on file.    Review of patient's allergies indicates:  No Known Allergies    No current facility-administered medications on file prior to encounter.     Current Outpatient Medications on File Prior to Encounter   Medication Sig    aspirin-acetaminophen-caffeine 250-250-65 mg (EXCEDRIN EXTRA STRENGTH) 250-250-65 mg per tablet Take 1 tablet by mouth every 6 (six) hours as needed for Pain.    UNKNOWN TO PATIENT Take 1 tablet by mouth as needed. PATIENT'S WIFE BACK MEDICATION     Family History    None       Tobacco Use    Smoking status: Never    Smokeless tobacco: Not on file   Substance and Sexual Activity    Alcohol use: Yes     Alcohol/week: 6.0 standard drinks of alcohol     Types: 6 Cans of beer per week     Comment: daily    Drug use: Not on file    Sexual activity: Not on file     Review of Systems   Constitutional:  Negative for activity change, appetite change, chills, diaphoresis, fatigue and fever.   Respiratory:  Negative for cough and shortness of breath.    Cardiovascular:  Negative for chest pain.   Gastrointestinal:  Negative for abdominal distention, abdominal pain and nausea.   Genitourinary:  Negative for difficulty urinating.   Musculoskeletal:  Positive for myalgias. Negative for back pain.   Neurological:  Negative for dizziness, facial asymmetry, light-headedness, numbness and headaches.     Objective:     Vital Signs (Most Recent):  Temp: 98.7 °F (37.1 °C) (06/25/24 0330)  Pulse: 67 (06/25/24 0330)  Resp: 20 (06/24/24 2212)  BP: 135/64 (06/25/24 0330)  SpO2: (!) 94 % (06/25/24 0330) Vital Signs (24h Range):  Temp:  [98.7 °F (37.1 °C)-99.1 °F (37.3 °C)] 98.7 °F (37.1 °C)  Pulse:  [67-87] 67  Resp:  [20] 20  SpO2:  [94 %-97 %] 94 %  BP: (135-139)/(64-87) 135/64     Weight: 111.1 kg (244 lb 14.9 oz)  Body mass index is 37.24 kg/m².     Physical Exam  Vitals and nursing note reviewed.   Constitutional:       Appearance:  He is obese. He is not ill-appearing.   Eyes:      Pupils: Pupils are equal, round, and reactive to light.   Cardiovascular:      Rate and Rhythm: Normal rate and regular rhythm.      Pulses: Normal pulses.           Dorsalis pedis pulses are 2+ on the left side.        Posterior tibial pulses are 2+ on the left side.      Heart sounds: Normal heart sounds.   Pulmonary:      Effort: Pulmonary effort is normal. No respiratory distress.      Breath sounds: Normal breath sounds. No wheezing.   Abdominal:      General: Bowel sounds are normal. There is no distension.      Palpations: Abdomen is soft.   Musculoskeletal:      Left lower leg: Edema present.   Feet:      Left foot:      Skin integrity: Erythema, warmth and dry skin present.      Comments: Left foot dressing cdi with foot boot  Skin:     General: Skin is warm and dry.      Capillary Refill: Capillary refill takes less than 2 seconds.      Findings: Erythema and wound (left foot) present.   Neurological:      Mental Status: He is alert and oriented to person, place, and time. Mental status is at baseline.      GCS: GCS eye subscore is 4. GCS verbal subscore is 5. GCS motor subscore is 6.              CRANIAL NERVES     CN III, IV, VI   Pupils are equal, round, and reactive to light.       Significant Labs: All pertinent labs within the past 24 hours have been reviewed.    Bilirubin:   Recent Labs   Lab 06/25/24  0027   BILITOT 0.5       BMP:   Recent Labs   Lab 06/25/24  0027   *      K 4.0      CO2 26   BUN 16   CREATININE 0.9   CALCIUM 9.6     CBC:   Recent Labs   Lab 06/25/24  0027   WBC 7.19   HGB 14.2   HCT 42.9        CMP:   Recent Labs   Lab 06/25/24  0027      K 4.0      CO2 26   *   BUN 16   CREATININE 0.9   CALCIUM 9.6   PROT 7.5   ALBUMIN 3.2*   BILITOT 0.5   ALKPHOS 59   AST 29   ALT 36   ANIONGAP 11       Lactic Acid:   Recent Labs   Lab 06/25/24  0027   LACTATE 1.2       POCT Glucose:   Recent Labs    Lab 06/24/24  2251   POCTGLUCOSE 118*       Urine Studies:   Recent Labs   Lab 06/25/24  0044   COLORU Yellow   APPEARANCEUA Clear   PHUR 6.0   SPECGRAV >1.030*   PROTEINUA 1+*   GLUCUA 3+*   KETONESU Negative   BILIRUBINUA Negative   OCCULTUA Negative   NITRITE Negative   UROBILINOGEN Negative   LEUKOCYTESUR 1+*   RBCUA 2   WBCUA 38*   BACTERIA None   SQUAMEPITHEL 1   HYALINECASTS 1       Significant Imaging: I have reviewed all pertinent imaging results/findings within the past 24 hours.

## 2024-06-25 NOTE — ASSESSMENT & PLAN NOTE
Culture taken of wound- Infectious disease following.     Awaiting MRI results.     Discussed with patient treatment options in details. Patient understands the risks and benefits and alternate treatment options.     Discussed with patient that given the severity of the infection in his foot, I recommend partial foot amputation in order to remove the infected portion of his foot.   Patient is agreeable to partial foot amputation- awaiting MRI results to determine the level of amputation necessary.     Will plan to take patient to the OR at some point this week    Patient will need to be non weight bearing following partial foot amputation until the sutures are removed. Discussed this with patient.      Counseled patient on increasing protein intake, not getting wound wet, keeping dressing clean dry and intact, following a healthy diet, elevating legs when able, removing pressure from wound

## 2024-06-26 LAB
ALBUMIN SERPL BCP-MCNC: 2.8 G/DL (ref 3.5–5.2)
ALP SERPL-CCNC: 51 U/L (ref 55–135)
ALT SERPL W/O P-5'-P-CCNC: 77 U/L (ref 10–44)
ANION GAP SERPL CALC-SCNC: 5 MMOL/L (ref 8–16)
AST SERPL-CCNC: 40 U/L (ref 10–40)
BASOPHILS # BLD AUTO: 0.1 K/UL (ref 0–0.2)
BASOPHILS NFR BLD: 1.8 % (ref 0–1.9)
BILIRUB SERPL-MCNC: 0.4 MG/DL (ref 0.1–1)
BUN SERPL-MCNC: 15 MG/DL (ref 6–20)
CALCIUM SERPL-MCNC: 9 MG/DL (ref 8.7–10.5)
CHLORIDE SERPL-SCNC: 105 MMOL/L (ref 95–110)
CO2 SERPL-SCNC: 29 MMOL/L (ref 23–29)
CREAT SERPL-MCNC: 0.9 MG/DL (ref 0.5–1.4)
DIFFERENTIAL METHOD BLD: ABNORMAL
EOSINOPHIL # BLD AUTO: 0.2 K/UL (ref 0–0.5)
EOSINOPHIL NFR BLD: 4 % (ref 0–8)
ERYTHROCYTE [DISTWIDTH] IN BLOOD BY AUTOMATED COUNT: 11.8 % (ref 11.5–14.5)
EST. GFR  (NO RACE VARIABLE): >60 ML/MIN/1.73 M^2
GLUCOSE SERPL-MCNC: 132 MG/DL (ref 70–110)
HCT VFR BLD AUTO: 42.1 % (ref 40–54)
HGB BLD-MCNC: 13.8 G/DL (ref 14–18)
IMM GRANULOCYTES # BLD AUTO: 0.03 K/UL (ref 0–0.04)
IMM GRANULOCYTES NFR BLD AUTO: 0.5 % (ref 0–0.5)
LYMPHOCYTES # BLD AUTO: 1.8 K/UL (ref 1–4.8)
LYMPHOCYTES NFR BLD: 31.2 % (ref 18–48)
MCH RBC QN AUTO: 31.5 PG (ref 27–31)
MCHC RBC AUTO-ENTMCNC: 32.8 G/DL (ref 32–36)
MCV RBC AUTO: 96 FL (ref 82–98)
MONOCYTES # BLD AUTO: 0.7 K/UL (ref 0.3–1)
MONOCYTES NFR BLD: 11.6 % (ref 4–15)
NEUTROPHILS # BLD AUTO: 2.9 K/UL (ref 1.8–7.7)
NEUTROPHILS NFR BLD: 50.9 % (ref 38–73)
NRBC BLD-RTO: 0 /100 WBC
OHS QRS DURATION: 106 MS
OHS QTC CALCULATION: 443 MS
PLATELET # BLD AUTO: 307 K/UL (ref 150–450)
PMV BLD AUTO: 9.3 FL (ref 9.2–12.9)
POTASSIUM SERPL-SCNC: 4 MMOL/L (ref 3.5–5.1)
PROT SERPL-MCNC: 6.6 G/DL (ref 6–8.4)
RBC # BLD AUTO: 4.38 M/UL (ref 4.6–6.2)
SODIUM SERPL-SCNC: 139 MMOL/L (ref 136–145)
VANCOMYCIN TROUGH SERPL-MCNC: 16.4 UG/ML (ref 10–22)
WBC # BLD AUTO: 5.7 K/UL (ref 3.9–12.7)

## 2024-06-26 PROCEDURE — 80053 COMPREHEN METABOLIC PANEL: CPT

## 2024-06-26 PROCEDURE — 25000003 PHARM REV CODE 250: Performed by: HOSPITALIST

## 2024-06-26 PROCEDURE — 99233 SBSQ HOSP IP/OBS HIGH 50: CPT | Mod: ,,, | Performed by: NURSE PRACTITIONER

## 2024-06-26 PROCEDURE — 25000003 PHARM REV CODE 250: Performed by: STUDENT IN AN ORGANIZED HEALTH CARE EDUCATION/TRAINING PROGRAM

## 2024-06-26 PROCEDURE — 99233 SBSQ HOSP IP/OBS HIGH 50: CPT | Mod: ,,, | Performed by: PODIATRIST

## 2024-06-26 PROCEDURE — 63600175 PHARM REV CODE 636 W HCPCS: Performed by: HOSPITALIST

## 2024-06-26 PROCEDURE — 36415 COLL VENOUS BLD VENIPUNCTURE: CPT

## 2024-06-26 PROCEDURE — 25000003 PHARM REV CODE 250: Performed by: INTERNAL MEDICINE

## 2024-06-26 PROCEDURE — 11000001 HC ACUTE MED/SURG PRIVATE ROOM

## 2024-06-26 PROCEDURE — 85025 COMPLETE CBC W/AUTO DIFF WBC: CPT

## 2024-06-26 PROCEDURE — 63600175 PHARM REV CODE 636 W HCPCS: Performed by: STUDENT IN AN ORGANIZED HEALTH CARE EDUCATION/TRAINING PROGRAM

## 2024-06-26 PROCEDURE — 63600175 PHARM REV CODE 636 W HCPCS: Performed by: INTERNAL MEDICINE

## 2024-06-26 PROCEDURE — 36415 COLL VENOUS BLD VENIPUNCTURE: CPT | Performed by: HOSPITALIST

## 2024-06-26 PROCEDURE — 80202 ASSAY OF VANCOMYCIN: CPT | Performed by: HOSPITALIST

## 2024-06-26 RX ORDER — SODIUM CHLORIDE 0.9 % (FLUSH) 0.9 %
10 SYRINGE (ML) INJECTION
Status: DISCONTINUED | OUTPATIENT
Start: 2024-06-26 | End: 2024-07-02 | Stop reason: HOSPADM

## 2024-06-26 RX ADMIN — METRONIDAZOLE 500 MG: 500 TABLET ORAL at 02:06

## 2024-06-26 RX ADMIN — VANCOMYCIN HYDROCHLORIDE 1750 MG: 1 INJECTION, POWDER, LYOPHILIZED, FOR SOLUTION INTRAVENOUS at 12:06

## 2024-06-26 RX ADMIN — CLINDAMYCIN IN 5 PERCENT DEXTROSE 900 MG: 18 INJECTION, SOLUTION INTRAVENOUS at 01:06

## 2024-06-26 RX ADMIN — CLINDAMYCIN IN 5 PERCENT DEXTROSE 900 MG: 18 INJECTION, SOLUTION INTRAVENOUS at 09:06

## 2024-06-26 RX ADMIN — CEFEPIME 2 G: 2 INJECTION, POWDER, FOR SOLUTION INTRAVENOUS at 03:06

## 2024-06-26 RX ADMIN — CEFEPIME 2 G: 2 INJECTION, POWDER, FOR SOLUTION INTRAVENOUS at 09:06

## 2024-06-26 RX ADMIN — CLINDAMYCIN IN 5 PERCENT DEXTROSE 900 MG: 18 INJECTION, SOLUTION INTRAVENOUS at 04:06

## 2024-06-26 RX ADMIN — METRONIDAZOLE 500 MG: 500 TABLET ORAL at 05:06

## 2024-06-26 RX ADMIN — METRONIDAZOLE 500 MG: 500 TABLET ORAL at 10:06

## 2024-06-26 RX ADMIN — CEFEPIME 2 G: 2 INJECTION, POWDER, FOR SOLUTION INTRAVENOUS at 11:06

## 2024-06-26 RX ADMIN — VANCOMYCIN HYDROCHLORIDE 1500 MG: 1.5 INJECTION, POWDER, LYOPHILIZED, FOR SOLUTION INTRAVENOUS at 02:06

## 2024-06-26 NOTE — PROGRESS NOTES
[facility]    Department of Infectious Disease  Progress Note        PATIENT NAME: Salvador Bermudez  MRN: [id number]  TODAY'S DATE: 06/26/2024  ADMIT DATE: 6/24/2024  LOS: 1 days    CHIEF COMPLAINT: Wound Check (Left foot wound, started 3 months ago after wearing a new pair of shoes on a cruise. )    PRINCIPLE PROBLEM: Cellulitis of left foot    REASON FOR CONSULT:  Osteo    INTERVAL HISTORY     06/26/2024@0732 (Denette):  He was lying in bed watching television.  He is having mild pain to left lower extremity.  He says the swelling and cramping are much improved.  He was waiting for Podiatry to let him know about surgery.  Patient tells me that he has had issues with his feet since he was a child.  He was seen specialist before and wore braces and it has not made much of a difference.  He had MRI that showed a possible abscess in left 5th metatarsal head osteomyelitis.  Eyes fevers or chills, nausea vomiting, states appetite was okay.  Voiding well, no diarrhea, no mouth blisters or ulcers.  T-max 98.1° in last 24 hours.  WBC 5.70, platelets 307, H&H 13.8/42.1, ESR 80, BUN/CR 15/0.9, ALT/AST 77/40, CRP 88.3.  MRSA screen negative.  Hemoglobin A1c 5.5.  Cultures obtained from left foot wound are pending.  Blood cultures no growth to date. Arterial ultrasound was also negative for any obstructive disease. Dressing was removed, wound cleansed with wound cleanser and reapplied with Aquacel, 4x4s and Kerlix.    Antibiotics (From admission, onward)      Start     Stop Route Frequency Ordered    06/25/24 2200  metroNIDAZOLE tablet 500 mg         -- Oral Every 8 hours 06/25/24 1914    06/25/24 1300  vancomycin (VANCOCIN) 1,750 mg in D5W 500 mL IVPB         -- IV Every 12 hours (non-standard times) 06/25/24 0311    06/25/24 1230  ceFEPIme (MAXIPIME) 2 g in D5W 100 mL IVPB (MB+)         -- IV Every 8 hours (non-standard times) 06/25/24 1129    06/25/24 1230  clindamycin in D5W 900 mg/50 mL IVPB 900 mg         06/28/24  "1229 IV Every 8 hours (non-standard times) 06/25/24 1129    06/25/24 0247  vancomycin - pharmacy to dose  (vancomycin IVPB (PEDS and ADULTS))        Placed in "And" Linked Group    -- IV pharmacy to manage frequency 06/25/24 0147             Antifungals (From admission, onward)      None           Antivirals (From admission, onward)      None           ASSESSMENT and PLAN     Severe left foot SSTI with abscess and osteomyelitis 5th metatarsal head   MRI with left 5th metatarsal head osteomyelitis and possible small abscess   Arterial ultrasound negative for significant obstructive disease   Hemoglobin A1c 5.5  6/25 Blood cultures x2 sets no growth to date, pending final   6/25 Wound cultures pending  ESR 80, CRP 88.3      Daily alcohol intake    RECOMMENDATIONS:   Adequate source control   Podiatry for I&D and washout of left 5th metatarsal - please send deep tissue and bone to pathology Gram stain and cultures including AFB and fungal   Follow bedside cultures   Continue vancomycin IV, keep level 15-20   Continue cefepime 2 g IV q.8  Continue Flagyl 500 mg IV q.8 for anaerobic coverage   Wound care to all affected areas while awaiting surgery  Pain management as per hospitalist     D/W Dr Bonilla    Please send Pson[employer] secure chat with any questions.    Review of patient's allergies indicates:  No Known Allergies    SUBJECTIVE     Review of systems  Negative unless stated above in Interval History     OBJECTIVE   Temp:  [97.6 °F (36.4 °C)-98.6 °F (37 °C)] 98.6 °F (37 °C)  Pulse:  [73-84] 81  Resp:  [16-18] 16  SpO2:  [96 %-97 %] 96 %  BP: (132-154)/(76-89) 132/86  Temp:  [97.6 °F (36.4 °C)-98.6 °F (37 °C)]   Temp: 98.6 °F (37 °C) (06/26/24 0728)  Pulse: 81 (06/26/24 0728)  Resp: 16 (06/26/24 0728)  BP: 132/86 (06/26/24 0728)  SpO2: 96 % (06/26/24 0728)    Intake/Output Summary (Last 24 hours) at 6/26/2024 0819  Last data filed at 6/26/2024 0655  Gross per 24 hour   Intake 1987.9 ml   Output 1225 ml   Net 762.9 " ml       Physical Exam  General:  Morbidly obese  male, lying in bed in no acute distress, breathing comfortable on room air  Eyes: Eyes with no icterus or injection. Vision grossly normal  Ears: Hearing grossly normal.  Nose: Nares patent  Mouth: Moist mucous membranes, dentition is good. No ulcerations, erythema or exudates.  Neck: Supple, no tenderness to palpation.  Cardiovascular: Regular rate and rhythm, no murmurs, no edema.    Respiratory:  Clear to auscultation bilaterally, no tachypnea or increased work of breathing.  Gastrointestinal:  Soft with active bowel sounds, no tenderness to palpation, no distention.  Genitourinary:  No suprapubic tenderness.  Musculoskeletal:  Moves all extremities with good strength.    Skin:  Warm and dry, left foot wound on wrapped, small amount of drainage on dressings, slightly decreased erythema and swelling per patient assessment, tender to palpation - cleansed with wound cleanser and Aquacel, 4 x 4 and Kerlix applied  Neuro:   Oriented, conversant, follows commands.  Psych: Good mood, normal affect.   VAD:  PIV  ISOLATION:  None    Wounds:   6/25:           Significant Labs: All pertinent labs within the past 24 hours have been reviewed.    CBC LAST 7  Recent Labs   Lab 06/25/24  0027 06/26/24  0445   WBC 7.19 5.70   RBC 4.54* 4.38*   HGB 14.2 13.8*   HCT 42.9 42.1   MCV 95 96   MCH 31.3* 31.5*   MCHC 33.1 32.8   RDW 11.9 11.8    307   MPV 9.3 9.3   GRAN 51.8  3.7 50.9  2.9   LYMPH 29.5  2.1 31.2  1.8   MONO 12.8  0.9 11.6  0.7   BASO 0.07 0.10   NRBC 0 0       CHEMISTRY LAST 7  Recent Labs   Lab 06/25/24  0027 06/25/24  0524 06/26/24  0445     --  139   K 4.0  --  4.0     --  105   CO2 26  --  29   ANIONGAP 11  --  5*   BUN 16  --  15   CREATININE 0.9  --  0.9   *  --  132*   CALCIUM 9.6  --  9.0   MG  --  2.0  --    ALBUMIN 3.2*  --  2.8*   PROT 7.5  --  6.6   ALKPHOS 59  --  51*   ALT 36  --  77*   AST 29  --  40   BILITOT 0.5   "--  0.4       Estimated Creatinine Clearance: 117.6 mL/min (based on SCr of 0.9 mg/dL).    INFLAMMATORY/PROCAL  LAST 7  Recent Labs   Lab 06/25/24  0524   CRP 88.3*     No results found for: "ESR"  CRP   Date Value Ref Range Status   06/25/2024 88.3 (H) 0.0 - 8.2 mg/L Final   09/21/2021 0.80 (H) <0.76 mg/dL Final       PRIOR  MICROBIOLOGY:    No results found for the last 90 days.        LAST 7 DAYS MICROBIOLOGY   Microbiology Results (last 7 days)       Procedure Component Value Units Date/Time    Urine culture [[id number]] Collected: 06/25/24 0044    Order Status: Completed Specimen: Urine Updated: 06/26/24 0803     Urine Culture, Routine No growth to date    Narrative:      Specimen Source->Urine    Aerobic culture [[id number]] Collected: 06/25/24 1414    Order Status: Sent Specimen: Wound from Foot, Left Updated: 06/25/24 1919    Culture, Anaerobe [[id number]] Collected: 06/25/24 1414    Order Status: Sent Specimen: Wound from Foot, Left Updated: 06/25/24 1916    Blood culture #1 **CANNOT BE ORDERED STAT** [[id number]] Collected: 06/25/24 0027    Order Status: Completed Specimen: Blood from Peripheral, Forearm, Right Updated: 06/25/24 1717     Blood Culture, Routine No Growth to date    Blood culture #2 **CANNOT BE ORDERED STAT** [[id number]] Collected: 06/25/24 0027    Order Status: Completed Specimen: Blood from Peripheral, Forearm, Left Updated: 06/25/24 1717     Blood Culture, Routine No Growth to date    MRSA Screen by PCR [[id number]] Collected: 06/25/24 0903    Order Status: Completed Specimen: Nasal Swab Updated: 06/25/24 1609     MRSA SCREEN BY PCR Negative    Culture, Anaerobe [[id number]] Collected: 06/25/24 0903    Order Status: Sent Specimen: Wound from Foot, Left Updated: 06/25/24 1439    Aerobic culture [[id number]] Collected: 06/25/24 0903    Order Status: Sent Specimen: Wound from Foot, Left Updated: 06/25/24 1439    Aerobic culture (Specify Source) **CANNOT BE ORDERED AS STAT** [[id number]]     " Order Status: Canceled Specimen: Wound from Foot, Left               CURRENT/PREVIOUS VISIT EKG  No results found for this or any previous visit.         Significant Imaging: I have reviewed all relevant and available imaging results/findings within the past 24 hours.    X-ray L foot: Soft tissue swelling and appearance suggesting ulcer in the soft tissues lateral to the 5th metatarsophalangeal joint.  No definitive findings of osteomyelitis but suggest further evaluation with MRI if indicated clinically.     US Lower Extremity Arteries Bilateral 06/25/24 1333   Multiphasic waveforms are present bilaterally from the common femoral through the dorsalis pedis arteries.  Peak systolic velocities are well maintained.  No focal stenosis is identified.   Impression:    No evidence for significant lower extremity arterial obstructive disease.    MRI Foot (Forefoot) Left Without Contrast 06/25/24 1545  There is a soft tissue wound with subjacent soft tissue swelling of the lateral forefoot at the level of the 5th metatarsophalangeal joint.  The 5th metatarsal head demonstrates cortical erosion and T1 hypointense, T2 hyperintense marrow signal abnormality, in keeping with osteomyelitis.  There is mild T2 hyperintense marrow signal mallet Ee within the base and shaft of the 5th proximal phalanx, without corresponding T1 signal abnormality, most compatible with reactive marrow edema.  There is a fluid collection within the subcutaneous soft tissues lateral to the 5th metatarsal joint, measuring 1.4 cm there is abundant T2 hyperintense signal within the subcutaneous soft tissues of the dorsum of the foot, which may reflect edema or cellulitis.   Impression:    Soft tissue wound of the lateral forefoot, accompanied by osteomyelitis of the head of the 5th metatarsal, as well as a small soft tissue fluid collection suspicious for abscess.       I spent a total of 52 minutes on the day of the visit.This includes face to face time  and non-face to face time preparing to see the patient (eg, review of tests), obtaining and/or reviewing separately obtained history, documenting clinical information in the electronic or other health record, independently interpreting results and communicating results to the patient/family/caregiver, or care coordinator.    Cheryl Mir NP  Date of Service: 06/26/2024      This note was created using [employer] voice recognition software that occasionally misinterpreted phrases or words.

## 2024-06-26 NOTE — PLAN OF CARE
[facility] - Med/Surg  Initial Discharge Assessment       Primary Care Provider: Lyubov, Primary Doctor    Admission Diagnosis: Cellulitis of left lower extremity [L03.116]    Admission Date: 6/24/2024  Expected Discharge Date:     Spoke to pt at bedside to complete assessment. Pt lives with spouse. Pt reports independence, drives self. Pt is uninsured- medicaid screen pending. Will have pt screened for [facility] financial asst if medicaid is denied. CM to follow    Transition of Care Barriers: Unisured    Payor: /     Extended Emergency Contact Information  Primary Emergency Contact: daniijovany  Mobile Phone: [phone]  Relation: Spouse   needed? No    Discharge Plan A: Home with family  Discharge Plan B: Home      [facility] [address] [place] - [address] [address]  [address]  [place] [address]  Phone: [phone] Fax: [phone]      Initial Assessment (most recent)       Adult Discharge Assessment - 06/26/24 1336          Discharge Assessment    Assessment Type Discharge Planning Assessment     Confirmed/corrected address, phone number and insurance Yes     Confirmed Demographics Correct on Facesheet     Source of Information patient     People in Home spouse     Do you expect to return to your current living situation? Yes     Do you have help at home or someone to help you manage your care at home? Yes     Prior to hospitilization cognitive status: Alert/Oriented     Current cognitive status: Alert/Oriented     Walking or Climbing Stairs Difficulty no     Dressing/Bathing Difficulty no     Equipment Currently Used at Home none     Readmission within 30 days? No     Patient currently being followed by outpatient case management? No     Do you currently have service(s) that help you manage your care at home? No     Do you take prescription medications? No     Do you have prescription coverage? No     How do you get to doctors appointments? family or friend will  provide;car, drives self     Are you on dialysis? No     Do you take coumadin? No     Discharge Plan A Home with family     Discharge Plan B Home     DME Needed Upon Discharge  none     Discharge Plan discussed with: Patient     Transition of Care Barriers Unisured

## 2024-06-26 NOTE — PROGRESS NOTES
[facility]/Surg  Podiatry  Progress Note    Patient Name: Salvador Bermudez  MRN: [id number]  Admission Date: 6/24/2024  Hospital Length of Stay: 1 days  Attending Physician: Aníbal Morrison Jr., MD  Primary Care Provider: Lyubov, Primary Doctor     Subjective:     Interval History:  Patient seen at the bedside.  States pain, though controlled, to the left foot.  MRI showed osteomyelitis of the 5th metatarsal as well as likely abscess in the area.  Arterial ultrasounds negative for any occlusions.        Scheduled Meds:   ceFEPime IV (PEDS and ADULTS)  2 g Intravenous Q8H    clindamycin IV (PEDS and ADULTS)  900 mg Intravenous Q8H    metroNIDAZOLE  500 mg Oral Q8H    vancomycin (VANCOCIN) IV (PEDS and ADULTS)  1,500 mg Intravenous Q12H     Continuous Infusions:   0.9% NaCl   Intravenous Continuous 100 mL/hr at 06/26/24 0655 Rate Verify at 06/26/24 0655     PRN Meds:  Current Facility-Administered Medications:     acetaminophen, 650 mg, Oral, Q4H PRN    aluminum-magnesium hydroxide-simethicone, 30 mL, Oral, QID PRN    dextrose 10%, 12.5 g, Intravenous, PRN    dextrose 10%, 25 g, Intravenous, PRN    glucagon (human recombinant), 1 mg, Intramuscular, PRN    glucose, 16 g, Oral, PRN    glucose, 24 g, Oral, PRN    HYDROcodone-acetaminophen, 1 tablet, Oral, Q6H PRN    magnesium oxide, 800 mg, Oral, PRN    magnesium oxide, 800 mg, Oral, PRN    morphine, 2 mg, Intravenous, Q6H PRN    naloxone, 0.02 mg, Intravenous, PRN    ondansetron, 4 mg, Intravenous, Q6H PRN    potassium bicarbonate, 35 mEq, Oral, PRN    potassium bicarbonate, 50 mEq, Oral, PRN    potassium bicarbonate, 60 mEq, Oral, PRN    prochlorperazine, 5 mg, Intravenous, Q6H PRN    senna-docusate 8.6-50 mg, 1 tablet, Oral, BID PRN    sodium chloride 0.9%, 10 mL, Intravenous, Q12H PRN    trazodone, 100 mg, Oral, Nightly PRN    Pharmacy to dose Vancomycin consult, , , Once **AND** vancomycin - pharmacy to dose, , Intravenous, pharmacy to manage  frequency    Review of Systems   Musculoskeletal:  Positive for joint swelling.   Skin:  Positive for color change and wound.   All other systems reviewed and are negative.    Objective:     Vital Signs (Most Recent):  Temp: 97.8 °F (36.6 °C) (06/26/24 1049)  Pulse: 74 (06/26/24 1049)  Resp: 14 (06/26/24 1049)  BP: 134/83 (06/26/24 1049)  SpO2: 95 % (06/26/24 1049) Vital Signs (24h Range):  Temp:  [97.6 °F (36.4 °C)-98.6 °F (37 °C)] 97.8 °F (36.6 °C)  Pulse:  [73-84] 74  Resp:  [14-18] 14  SpO2:  [95 %-97 %] 95 %  BP: (132-154)/(76-89) 134/83     Weight: 113.9 kg (251 lb)  Body mass index is 38.16 kg/m².    Foot Exam    Laboratory:  A1C:   Recent Labs   Lab 06/25/24  0523   HGBA1C 5.5     CBC:   Recent Labs   Lab 06/26/24  0445   WBC 5.70   RBC 4.38*   HGB 13.8*   HCT 42.1      MCV 96   MCH 31.5*   MCHC 32.8     CMP:   Recent Labs   Lab 06/26/24  0445   *   CALCIUM 9.0   ALBUMIN 2.8*   PROT 6.6      K 4.0   CO2 29      BUN 15   CREATININE 0.9   ALKPHOS 51*   ALT 77*   AST 40   BILITOT 0.4     CRP:   Recent Labs   Lab 06/25/24  0524   CRP 88.3*     ESR:   Recent Labs   Lab 06/25/24  0523   SEDRATE 80*     Wound Cultures:   Recent Labs   Lab 06/25/24  0903 06/25/24  1414   LABAERO STAPHYLOCOCCUS AUREUS  Few  Susceptibility pending  * STAPHYLOCOCCUS AUREUS  For susceptibility see order #[id number]  *       Diagnostic Results:  I have reviewed all pertinent imaging results/findings within the past 24 hours.    MRI Foot (Forefoot) Left Without Contrast  Narrative: EXAMINATION:  MRI FOOT (FOREFOOT) LEFT WITHOUT CONTRAST    CLINICAL HISTORY:  Osteomyelitis, foot;    TECHNIQUE:  Multiplanar, multisequence MR imaging of the left forefoot was performed without contrast    COMPARISON:  None    FINDINGS:  There is a soft tissue wound with subjacent soft tissue swelling of the lateral forefoot at the level of the 5th metatarsophalangeal joint.  The 5th metatarsal head demonstrates cortical erosion and  T1 hypointense, T2 hyperintense marrow signal abnormality, in keeping with osteomyelitis.  There is mild T2 hyperintense marrow signal mallet Ee within the base and shaft of the 5th proximal phalanx, without corresponding T1 signal abnormality, most compatible with reactive marrow edema.  There is a fluid collection within the subcutaneous soft tissues lateral to the 5th metatarsal joint, measuring 1.4 cm there is abundant T2 hyperintense signal within the subcutaneous soft tissues of the dorsum of the foot, which may reflect edema or cellulitis.  Impression: Soft tissue wound of the lateral forefoot, accompanied by osteomyelitis of the head of the 5th metatarsal, as well as a small soft tissue fluid collection suspicious for abscess.    Electronically signed by: Grover Lugo MD  Date:    06/25/2024  Time:    15:45  US Lower Extremity Arteries Bilateral  Narrative: EXAMINATION:  US LOWER EXTREMITY ARTERIES BILATERAL    CLINICAL HISTORY:  rule out PAD;    TECHNIQUE:  Bilateral spectral, color and grayscale images of the large arteries of both lower extremities were performed.    COMPARISON:  None    FINDINGS:  Multiphasic waveforms are present bilaterally from the common femoral through the dorsalis pedis arteries.  Peak systolic velocities are well maintained.  No focal stenosis is identified.  Impression: No evidence for significant lower extremity arterial obstructive disease.    Electronically signed by: Grover Lugo MD  Date:    06/25/2024  Time:    13:33  X-Ray Foot Complete Left  Narrative: EXAMINATION:  XR FOOT COMPLETE 3 VIEW LEFT    CLINICAL HISTORY:  .  Non-pressure chronic ulcer of other part of unspecified foot with unspecified severity    TECHNIQUE:  AP, lateral and oblique views of the left foot were performed.    COMPARISON:  None    FINDINGS:  Prominent enthesophytes at site of insertion of the Achilles tendon.  Steep plantar arch.  Ossification possibly heterotopic ossification around the  4th metatarsal.  Scattered degenerative changes    Rather marked focal soft tissue swelling lateral to the 5th metatarsophalangeal joint and heterogenicity suggesting ulcer and air in the soft tissues.  There is very slight indistinctness of the cortex of the head of the 5th metatarsal on the AP view.  Definite osseous destruction is not seen but further evaluation for such could be obtained with MRI.  No acute fracture or dislocation.  Impression: Soft tissue swelling and appearance suggesting ulcer in the soft tissues lateral to the 5th metatarsophalangeal joint.  No definitive findings of osteomyelitis but suggest further evaluation with MRI if indicated clinically.    Electronically signed by: Meenu Grace MD  Date:    06/25/2024  Time:    09:09        Clinical Findings:  There is significant improvement in edema and erythema to the foot.  Full-thickness ulceration is still present.  Able to express small amount of drainage from the wound.  Fifth toe appears viable.    Assessment/Plan:     Active Diagnoses:    Diagnosis Date Noted POA    PRINCIPAL PROBLEM:  Cellulitis of left foot [L03.116] 06/25/2024 Yes    Obesity (BMI 35.0-39.9 without comorbidity) [E66.9] 06/25/2024 Yes      Problems Resolved During this Admission:       I had an at length discussion with the patient regarding his left foot and the ongoing treatment options.  We discussed the options of debridement of the wound and excision of the infected bone versus amputation of a portion of the 5th ray.  I discussed both the risk and benefits associated with each of these.  Patient states that if possible he would like to try and salvage the foot.  Given the clinical improvement since admission I do think that this is a viable option.  I did however discussed there is a possibility with this treatment plan that the foot could not heal and he would ultimately require amputation.  Patient did state understanding.  Given this, plan will be for excisional  debridement and likely 5th metatarsal head resection in OR tomorrow.  NPO after midnight.    Jaime Green DPM  Podiatry  [facility]

## 2024-06-26 NOTE — PROGRESS NOTES
Pharmacokinetic Assessment Follow Up: IV Vancomycin    Vancomycin serum concentration assessment(s):    The trough level was drawn correctly and can be used to guide therapy at this time. The measurement is within the desired definitive target range of 15 to 20 mcg/mL.    Vancomycin Regimen Plan:    Change regimen to Vancomycin 1500 mg IV every 12 hours with next serum trough concentration measured at 0000 prior to 4th dose on 6/28    Drug levels (last 3 results):  Recent Labs   Lab Result Units 06/26/24  1145   Vancomycin-Trough ug/mL 16.4       Pharmacy will continue to follow and monitor vancomycin.    Please contact pharmacy at extension [phone] for questions regarding this assessment.    Thank you for the consult,   Mic Ayala       Patient brief summary:  Salvador Bermudez is a 54 y.o. male initiated on antimicrobial therapy with IV Vancomycin for treatment of skin & soft tissue infection    The patient's current regimen is 1750 every 12 hours    Drug Allergies:   Review of patient's allergies indicates:  No Known Allergies    Actual Body Weight:   113.9    Renal Function:   Estimated Creatinine Clearance: 117.6 mL/min (based on SCr of 0.9 mg/dL).,     Dialysis Method (if applicable):  N/A    CBC (last 72 hours):  Recent Labs   Lab Result Units 06/25/24  0027 06/25/24  0523 06/26/24  0445   WBC K/uL 7.19  --  5.70   Hemoglobin g/dL 14.2  --  13.8*   Hemoglobin A1C %  --  5.5  --    Hematocrit % 42.9  --  42.1   Platelets K/uL 336  --  307   Gran % % 51.8  --  50.9   Lymph % % 29.5  --  31.2   Mono % % 12.8  --  11.6   Eosinophil % % 4.2  --  4.0   Basophil % % 1.0  --  1.8   Differential Method  Automated  --  Automated       Metabolic Panel (last 72 hours):  Recent Labs   Lab Result Units 06/25/24  0027 06/25/24  0044 06/25/24 0524 06/26/24  0445   Sodium mmol/L 139  --   --  139   Potassium mmol/L 4.0  --   --  4.0   Chloride mmol/L 102  --   --  105   CO2 mmol/L 26  --   --  29   Glucose mg/dL 113*  --   --   132*   Glucose, UA   --  3+*  --   --    BUN mg/dL 16  --   --  15   Creatinine mg/dL 0.9  --   --  0.9   Albumin g/dL 3.2*  --   --  2.8*   Total Bilirubin mg/dL 0.5  --   --  0.4   Alkaline Phosphatase U/L 59  --   --  51*   AST U/L 29  --   --  40   ALT U/L 36  --   --  77*   Magnesium mg/dL  --   --  2.0  --    Phosphorus mg/dL  --   --  3.3  --        Vancomycin Administrations:  vancomycin given in the last 96 hours                     vancomycin (VANCOCIN) 1,750 mg in D5W 500 mL IVPB (mg) 1,750 mg New Bag 06/26/24 0010     1,750 mg New Bag 06/25/24 1530    vancomycin (VANCOCIN) 1,750 mg in D5W 500 mL IVPB (mg) 1,750 mg New Bag 06/25/24 0050                    Microbiologic Results:  Microbiology Results (last 7 days)       Procedure Component Value Units Date/Time    Aerobic culture [[id number]]  (Abnormal) Collected: 06/25/24 1414    Order Status: Completed Specimen: Wound from Foot, Left Updated: 06/26/24 1117     Aerobic Bacterial Culture STAPHYLOCOCCUS AUREUS  For susceptibility see order #[id number]      Aerobic culture [[id number]]  (Abnormal) Collected: 06/25/24 0903    Order Status: Completed Specimen: Wound from Foot, Left Updated: 06/26/24 1107     Aerobic Bacterial Culture STAPHYLOCOCCUS AUREUS  Few  Susceptibility pending      Blood culture #1 **CANNOT BE ORDERED STAT** [[id number]] Collected: 06/25/24 0027    Order Status: Completed Specimen: Blood from Peripheral, Forearm, Right Updated: 06/26/24 1032     Blood Culture, Routine No Growth to date      No Growth to date    Blood culture #2 **CANNOT BE ORDERED STAT** [[id number]] Collected: 06/25/24 0027    Order Status: Completed Specimen: Blood from Peripheral, Forearm, Left Updated: 06/26/24 1032     Blood Culture, Routine No Growth to date      No Growth to date    Urine culture [[id number]] Collected: 06/25/24 0044    Order Status: Completed Specimen: Urine Updated: 06/26/24 0803     Urine Culture, Routine No growth to date    Narrative:       Specimen Source->Urine    Culture, Anaerobe [[id number]] Collected: 06/25/24 1414    Order Status: Sent Specimen: Wound from Foot, Left Updated: 06/25/24 1916    MRSA Screen by PCR [[id number]] Collected: 06/25/24 0903    Order Status: Completed Specimen: Nasal Swab Updated: 06/25/24 1609     MRSA SCREEN BY PCR Negative    Culture, Anaerobe [[id number]] Collected: 06/25/24 0903    Order Status: Sent Specimen: Wound from Foot, Left Updated: 06/25/24 1439    Aerobic culture (Specify Source) **CANNOT BE ORDERED AS STAT** [[id number]]     Order Status: Canceled Specimen: Wound from Foot, Left

## 2024-06-26 NOTE — PROGRESS NOTES
[facility] Medicine  Progress Note    Patient Name: Salvador Bermudez  MRN: [id number]  Patient Class: IP- Inpatient   Admission Date: 6/24/2024  Length of Stay: 1 days  Attending Physician: Aníbal Morrison Jr., MD  Primary Care Provider: Lyubov, Primary Doctor        Subjective:     Principal Problem:Cellulitis of left foot        HPI:  Salvador Bermudez is a 54 year old male with a past medical history of obesity who presents with complaints of left foot wound which started 3 months ago however worsening pain, swelling, and redness over the past 3 days. He reports 3 months ago he had a blister to the left foot after wearing a pair of new shoes while on a cruise. He began treatment with wound care however his left foot is more swollen.  ED workup:  Left foot x-ray concerning for possible bony involvement at the base of the left metatarsal.  CBC and BMP unremarkable. Lactic acid 1.2 and  ESR  pending. Started on vancomycin in ED. Left foot MRI pending. Podiatry consult placed.  Admitted to hospital medicine for further management and treatment.                        Overview/Hospital Course:  No notes on file    Interval History:  No acute events overnight.  Patient states he is well at this time.  States he does not have diabetes.  Asking for other recommendations from Podiatry and Infectious Disease on treatment options.    Review of Systems  Objective:     Vital Signs (Most Recent):  Temp: 98.6 °F (37 °C) (06/26/24 0728)  Pulse: 81 (06/26/24 0728)  Resp: 16 (06/26/24 0728)  BP: 132/86 (06/26/24 0728)  SpO2: 96 % (06/26/24 0728) Vital Signs (24h Range):  Temp:  [97.6 °F (36.4 °C)-98.6 °F (37 °C)] 98.6 °F (37 °C)  Pulse:  [73-84] 81  Resp:  [16-18] 16  SpO2:  [96 %-97 %] 96 %  BP: (132-154)/(76-89) 132/86     Weight: 113.9 kg (251 lb)  Body mass index is 38.16 kg/m².    Intake/Output Summary (Last 24 hours) at 6/26/2024 1005  Last data filed at 6/26/2024 0655  Gross per 24 hour   Intake  1987.9 ml   Output 1225 ml   Net 762.9 ml         Physical Exam  Constitutional:       Appearance: Normal appearance.   HENT:      Head: Normocephalic and atraumatic.      Right Ear: External ear normal.      Left Ear: External ear normal.      Nose: Nose normal.      Mouth/Throat:      Mouth: Mucous membranes are moist.      Pharynx: Oropharynx is clear.   Eyes:      Extraocular Movements: Extraocular movements intact.      Conjunctiva/sclera: Conjunctivae normal.   Cardiovascular:      Rate and Rhythm: Normal rate and regular rhythm.      Pulses: Normal pulses.      Heart sounds: Normal heart sounds. No murmur heard.     No gallop.   Pulmonary:      Effort: Pulmonary effort is normal. No respiratory distress.      Breath sounds: Normal breath sounds. No wheezing, rhonchi or rales.   Abdominal:      General: Abdomen is flat. There is no distension.      Palpations: Abdomen is soft.      Tenderness: There is no abdominal tenderness.   Musculoskeletal:         General: No swelling. Normal range of motion.      Cervical back: Normal range of motion and neck supple.      Comments: Left foot bandage   Skin:     General: Skin is warm and dry.   Neurological:      General: No focal deficit present.      Mental Status: He is alert and oriented to person, place, and time.             Significant Labs: All pertinent labs within the past 24 hours have been reviewed.    Significant Imaging: I have reviewed all pertinent imaging results/findings within the past 24 hours.    Assessment/Plan:      * Cellulitis of left foot  -Podiatry Consult, appreciate recommendations  -wound care consult  -MRI concerning for abscess and osteo  -blood culture pending  -continue IV antibiotics per ID recommendations  -analgesic prn  -reviewed labs      Obesity (BMI 35.0-39.9 without comorbidity)  Body mass index is 37.24 kg/m². Morbid obesity complicates all aspects of disease management from diagnostic modalities to treatment. Weight loss  encouraged and health benefits explained to patient.           VTE Risk Mitigation (From admission, onward)           Ordered     IP VTE HIGH RISK PATIENT  Once         06/25/24 0143     Place sequential compression device  Until discontinued         06/25/24 0143                    Discharge Planning   BECKY:      Code Status: Full Code   Is the patient medically ready for discharge?:     Reason for patient still in hospital (select all that apply): Treatment                     Aníbal Morrison Jr, MD  Department of Hospital Medicine   [facility]/Surg

## 2024-06-26 NOTE — NURSING
Attempted to see pt. for Wound Care but   Dr. Jaime Green at bedside and reports that he just completed pt's. Dressing change. It can be changed again tomorrow.  Dr. Green informs the pt. and I that he will be taking him to surgery in a day or two.

## 2024-06-26 NOTE — PLAN OF CARE
Plan of care reviewed with patient and spouse, verbalized understanding. IV intact and patent with IVF infusing as ordered, no s/s infection noted to insertion site. ABX given as ordered. Meds given per MAR. No complaints of pain. IS at bedside and encouraged use. NAD noted. Purposeful q2hr rounding done. Safety maintained with side rails up x3, bed wheels locked, bed in lowest position, bed alarm set, slip resistant socks maintained, and call light with in reach of patient. Patient educated to call for assistance when needed. Patient remains free of falls. No further needs expressed at this time. Will continue to monitor.      Problem: Adult Inpatient Plan of Care  Goal: Plan of Care Review  Outcome: Progressing  Goal: Patient-Specific Goal (Individualized)  Outcome: Progressing  Goal: Absence of Hospital-Acquired Illness or Injury  Outcome: Progressing  Goal: Optimal Comfort and Wellbeing  Outcome: Progressing  Goal: Readiness for Transition of Care  Outcome: Progressing     Problem: Infection  Goal: Absence of Infection Signs and Symptoms  Outcome: Progressing     Problem: Wound  Goal: Optimal Coping  Outcome: Progressing  Goal: Optimal Functional Ability  Outcome: Progressing  Goal: Absence of Infection Signs and Symptoms  Outcome: Progressing  Goal: Improved Oral Intake  Outcome: Progressing  Goal: Optimal Pain Control and Function  Outcome: Progressing  Goal: Skin Health and Integrity  Outcome: Progressing  Goal: Optimal Wound Healing  Outcome: Progressing     Problem: Pain Acute  Goal: Optimal Pain Control and Function  Outcome: Progressing

## 2024-06-26 NOTE — SUBJECTIVE & OBJECTIVE
Interval History:  No acute events overnight.  Patient states he is well at this time.  States he does not have diabetes.  Asking for other recommendations from Podiatry and Infectious Disease on treatment options.    Review of Systems  Objective:     Vital Signs (Most Recent):  Temp: 98.6 °F (37 °C) (06/26/24 0728)  Pulse: 81 (06/26/24 0728)  Resp: 16 (06/26/24 0728)  BP: 132/86 (06/26/24 0728)  SpO2: 96 % (06/26/24 0728) Vital Signs (24h Range):  Temp:  [97.6 °F (36.4 °C)-98.6 °F (37 °C)] 98.6 °F (37 °C)  Pulse:  [73-84] 81  Resp:  [16-18] 16  SpO2:  [96 %-97 %] 96 %  BP: (132-154)/(76-89) 132/86     Weight: 113.9 kg (251 lb)  Body mass index is 38.16 kg/m².    Intake/Output Summary (Last 24 hours) at 6/26/2024 1005  Last data filed at 6/26/2024 0655  Gross per 24 hour   Intake 1987.9 ml   Output 1225 ml   Net 762.9 ml         Physical Exam  Constitutional:       Appearance: Normal appearance.   HENT:      Head: Normocephalic and atraumatic.      Right Ear: External ear normal.      Left Ear: External ear normal.      Nose: Nose normal.      Mouth/Throat:      Mouth: Mucous membranes are moist.      Pharynx: Oropharynx is clear.   Eyes:      Extraocular Movements: Extraocular movements intact.      Conjunctiva/sclera: Conjunctivae normal.   Cardiovascular:      Rate and Rhythm: Normal rate and regular rhythm.      Pulses: Normal pulses.      Heart sounds: Normal heart sounds. No murmur heard.     No gallop.   Pulmonary:      Effort: Pulmonary effort is normal. No respiratory distress.      Breath sounds: Normal breath sounds. No wheezing, rhonchi or rales.   Abdominal:      General: Abdomen is flat. There is no distension.      Palpations: Abdomen is soft.      Tenderness: There is no abdominal tenderness.   Musculoskeletal:         General: No swelling. Normal range of motion.      Cervical back: Normal range of motion and neck supple.      Comments: Left foot bandage   Skin:     General: Skin is warm and dry.    Neurological:      General: No focal deficit present.      Mental Status: He is alert and oriented to person, place, and time.             Significant Labs: All pertinent labs within the past 24 hours have been reviewed.    Significant Imaging: I have reviewed all pertinent imaging results/findings within the past 24 hours.

## 2024-06-26 NOTE — ASSESSMENT & PLAN NOTE
-Podiatry Consult, appreciate recommendations  -wound care consult  -MRI concerning for abscess and osteo  -blood culture pending  -continue IV antibiotics per ID recommendations  -analgesic prn  -reviewed labs

## 2024-06-27 ENCOUNTER — ANESTHESIA EVENT (OUTPATIENT)
Dept: SURGERY | Facility: HOSPITAL | Age: 54
End: 2024-06-27
Payer: MEDICAID

## 2024-06-27 ENCOUNTER — ANESTHESIA (OUTPATIENT)
Dept: SURGERY | Facility: HOSPITAL | Age: 54
End: 2024-06-27
Payer: MEDICAID

## 2024-06-27 LAB
ALBUMIN SERPL BCP-MCNC: 2.9 G/DL (ref 3.5–5.2)
ALP SERPL-CCNC: 55 U/L (ref 55–135)
ALT SERPL W/O P-5'-P-CCNC: 59 U/L (ref 10–44)
ANION GAP SERPL CALC-SCNC: 10 MMOL/L (ref 8–16)
AST SERPL-CCNC: 26 U/L (ref 10–40)
BACTERIA SPEC AEROBE CULT: ABNORMAL
BACTERIA SPEC AEROBE CULT: ABNORMAL
BACTERIA SPEC ANAEROBE CULT: NORMAL
BACTERIA UR CULT: NORMAL
BACTERIA UR CULT: NORMAL
BASOPHILS # BLD AUTO: 0.08 K/UL (ref 0–0.2)
BASOPHILS NFR BLD: 1.5 % (ref 0–1.9)
BILIRUB SERPL-MCNC: 0.3 MG/DL (ref 0.1–1)
BUN SERPL-MCNC: 12 MG/DL (ref 6–20)
CALCIUM SERPL-MCNC: 9.2 MG/DL (ref 8.7–10.5)
CHLORIDE SERPL-SCNC: 104 MMOL/L (ref 95–110)
CO2 SERPL-SCNC: 24 MMOL/L (ref 23–29)
CREAT SERPL-MCNC: 0.9 MG/DL (ref 0.5–1.4)
DIFFERENTIAL METHOD BLD: ABNORMAL
EOSINOPHIL # BLD AUTO: 0.2 K/UL (ref 0–0.5)
EOSINOPHIL NFR BLD: 3.9 % (ref 0–8)
ERYTHROCYTE [DISTWIDTH] IN BLOOD BY AUTOMATED COUNT: 11.7 % (ref 11.5–14.5)
EST. GFR  (NO RACE VARIABLE): >60 ML/MIN/1.73 M^2
GLUCOSE SERPL-MCNC: 131 MG/DL (ref 70–110)
HCT VFR BLD AUTO: 42.1 % (ref 40–54)
HGB BLD-MCNC: 14 G/DL (ref 14–18)
IMM GRANULOCYTES # BLD AUTO: 0.05 K/UL (ref 0–0.04)
IMM GRANULOCYTES NFR BLD AUTO: 0.9 % (ref 0–0.5)
LYMPHOCYTES # BLD AUTO: 1.7 K/UL (ref 1–4.8)
LYMPHOCYTES NFR BLD: 31.4 % (ref 18–48)
MCH RBC QN AUTO: 31 PG (ref 27–31)
MCHC RBC AUTO-ENTMCNC: 33.3 G/DL (ref 32–36)
MCV RBC AUTO: 93 FL (ref 82–98)
MONOCYTES # BLD AUTO: 0.6 K/UL (ref 0.3–1)
MONOCYTES NFR BLD: 10.3 % (ref 4–15)
NEUTROPHILS # BLD AUTO: 2.8 K/UL (ref 1.8–7.7)
NEUTROPHILS NFR BLD: 52 % (ref 38–73)
NRBC BLD-RTO: 0 /100 WBC
PLATELET # BLD AUTO: 315 K/UL (ref 150–450)
PMV BLD AUTO: 9.1 FL (ref 9.2–12.9)
POTASSIUM SERPL-SCNC: 3.9 MMOL/L (ref 3.5–5.1)
PROT SERPL-MCNC: 6.7 G/DL (ref 6–8.4)
RBC # BLD AUTO: 4.52 M/UL (ref 4.6–6.2)
SODIUM SERPL-SCNC: 138 MMOL/L (ref 136–145)
WBC # BLD AUTO: 5.35 K/UL (ref 3.9–12.7)

## 2024-06-27 PROCEDURE — 37000008 HC ANESTHESIA 1ST 15 MINUTES: Performed by: PODIATRIST

## 2024-06-27 PROCEDURE — 25000003 PHARM REV CODE 250: Performed by: STUDENT IN AN ORGANIZED HEALTH CARE EDUCATION/TRAINING PROGRAM

## 2024-06-27 PROCEDURE — 63600175 PHARM REV CODE 636 W HCPCS: Performed by: INTERNAL MEDICINE

## 2024-06-27 PROCEDURE — 11000001 HC ACUTE MED/SURG PRIVATE ROOM

## 2024-06-27 PROCEDURE — 25000003 PHARM REV CODE 250: Performed by: INTERNAL MEDICINE

## 2024-06-27 PROCEDURE — 94799 UNLISTED PULMONARY SVC/PX: CPT | Mod: XB

## 2024-06-27 PROCEDURE — 63600175 PHARM REV CODE 636 W HCPCS: Mod: JZ,JG | Performed by: PODIATRIST

## 2024-06-27 PROCEDURE — 85025 COMPLETE CBC W/AUTO DIFF WBC: CPT

## 2024-06-27 PROCEDURE — 63600175 PHARM REV CODE 636 W HCPCS: Performed by: NURSE ANESTHETIST, CERTIFIED REGISTERED

## 2024-06-27 PROCEDURE — 27201423 OPTIME MED/SURG SUP & DEVICES STERILE SUPPLY: Performed by: PODIATRIST

## 2024-06-27 PROCEDURE — 87205 SMEAR GRAM STAIN: CPT | Mod: 59 | Performed by: INTERNAL MEDICINE

## 2024-06-27 PROCEDURE — 87075 CULTR BACTERIA EXCEPT BLOOD: CPT | Mod: 59 | Performed by: INTERNAL MEDICINE

## 2024-06-27 PROCEDURE — 37000009 HC ANESTHESIA EA ADD 15 MINS: Performed by: PODIATRIST

## 2024-06-27 PROCEDURE — C1751 CATH, INF, PER/CENT/MIDLINE: HCPCS

## 2024-06-27 PROCEDURE — 63600175 PHARM REV CODE 636 W HCPCS: Mod: JZ,JG | Performed by: STUDENT IN AN ORGANIZED HEALTH CARE EDUCATION/TRAINING PROGRAM

## 2024-06-27 PROCEDURE — 71000039 HC RECOVERY, EACH ADD'L HOUR: Performed by: PODIATRIST

## 2024-06-27 PROCEDURE — 25000003 PHARM REV CODE 250: Performed by: ANESTHESIOLOGY

## 2024-06-27 PROCEDURE — 94761 N-INVAS EAR/PLS OXIMETRY MLT: CPT

## 2024-06-27 PROCEDURE — 25000003 PHARM REV CODE 250: Performed by: NURSE ANESTHETIST, CERTIFIED REGISTERED

## 2024-06-27 PROCEDURE — 99406 BEHAV CHNG SMOKING 3-10 MIN: CPT

## 2024-06-27 PROCEDURE — 87070 CULTURE OTHR SPECIMN AEROBIC: CPT | Mod: 59 | Performed by: INTERNAL MEDICINE

## 2024-06-27 PROCEDURE — 36000707: Performed by: PODIATRIST

## 2024-06-27 PROCEDURE — 80053 COMPREHEN METABOLIC PANEL: CPT

## 2024-06-27 PROCEDURE — 94799 UNLISTED PULMONARY SVC/PX: CPT

## 2024-06-27 PROCEDURE — 36415 COLL VENOUS BLD VENIPUNCTURE: CPT

## 2024-06-27 PROCEDURE — 63600175 PHARM REV CODE 636 W HCPCS: Performed by: STUDENT IN AN ORGANIZED HEALTH CARE EDUCATION/TRAINING PROGRAM

## 2024-06-27 PROCEDURE — 99900035 HC TECH TIME PER 15 MIN (STAT)

## 2024-06-27 PROCEDURE — 0QBP0ZZ EXCISION OF LEFT METATARSAL, OPEN APPROACH: ICD-10-PCS | Performed by: INTERNAL MEDICINE

## 2024-06-27 PROCEDURE — 36000706: Performed by: PODIATRIST

## 2024-06-27 PROCEDURE — 36410 VNPNXR 3YR/> PHY/QHP DX/THER: CPT

## 2024-06-27 PROCEDURE — 87102 FUNGUS ISOLATION CULTURE: CPT | Performed by: INTERNAL MEDICINE

## 2024-06-27 PROCEDURE — 25000003 PHARM REV CODE 250

## 2024-06-27 PROCEDURE — 71000033 HC RECOVERY, INTIAL HOUR: Performed by: PODIATRIST

## 2024-06-27 RX ORDER — LIDOCAINE HYDROCHLORIDE 10 MG/ML
1 INJECTION, SOLUTION EPIDURAL; INFILTRATION; INTRACAUDAL; PERINEURAL ONCE
Status: DISCONTINUED | OUTPATIENT
Start: 2024-06-27 | End: 2024-06-27 | Stop reason: HOSPADM

## 2024-06-27 RX ORDER — PROMETHAZINE HYDROCHLORIDE 25 MG/1
25 TABLET ORAL EVERY 6 HOURS PRN
Status: DISCONTINUED | OUTPATIENT
Start: 2024-06-27 | End: 2024-07-02 | Stop reason: HOSPADM

## 2024-06-27 RX ORDER — LIDOCAINE HYDROCHLORIDE 20 MG/ML
INJECTION INTRAVENOUS
Status: DISCONTINUED | OUTPATIENT
Start: 2024-06-27 | End: 2024-06-27

## 2024-06-27 RX ORDER — HYDROCODONE BITARTRATE AND ACETAMINOPHEN 10; 325 MG/1; MG/1
1 TABLET ORAL EVERY 4 HOURS PRN
Status: DISCONTINUED | OUTPATIENT
Start: 2024-06-27 | End: 2024-07-02 | Stop reason: HOSPADM

## 2024-06-27 RX ORDER — ONDANSETRON 4 MG/1
8 TABLET, ORALLY DISINTEGRATING ORAL EVERY 8 HOURS PRN
Status: DISCONTINUED | OUTPATIENT
Start: 2024-06-27 | End: 2024-07-02 | Stop reason: HOSPADM

## 2024-06-27 RX ORDER — ONDANSETRON HYDROCHLORIDE 2 MG/ML
INJECTION, SOLUTION INTRAMUSCULAR; INTRAVENOUS
Status: DISCONTINUED | OUTPATIENT
Start: 2024-06-27 | End: 2024-06-27

## 2024-06-27 RX ORDER — DEXAMETHASONE SODIUM PHOSPHATE 4 MG/ML
INJECTION, SOLUTION INTRA-ARTICULAR; INTRALESIONAL; INTRAMUSCULAR; INTRAVENOUS; SOFT TISSUE
Status: DISCONTINUED | OUTPATIENT
Start: 2024-06-27 | End: 2024-06-27

## 2024-06-27 RX ORDER — FENTANYL CITRATE 50 UG/ML
25 INJECTION, SOLUTION INTRAMUSCULAR; INTRAVENOUS EVERY 5 MIN PRN
Status: DISCONTINUED | OUTPATIENT
Start: 2024-06-27 | End: 2024-06-27 | Stop reason: HOSPADM

## 2024-06-27 RX ORDER — BUPIVACAINE HYDROCHLORIDE 5 MG/ML
INJECTION, SOLUTION EPIDURAL; INTRACAUDAL
Status: DISCONTINUED | OUTPATIENT
Start: 2024-06-27 | End: 2024-06-27 | Stop reason: HOSPADM

## 2024-06-27 RX ORDER — OXYCODONE HYDROCHLORIDE 5 MG/1
5 TABLET ORAL
Status: DISCONTINUED | OUTPATIENT
Start: 2024-06-27 | End: 2024-06-27 | Stop reason: HOSPADM

## 2024-06-27 RX ORDER — KETOROLAC TROMETHAMINE 30 MG/ML
INJECTION, SOLUTION INTRAMUSCULAR; INTRAVENOUS
Status: DISCONTINUED | OUTPATIENT
Start: 2024-06-27 | End: 2024-06-27

## 2024-06-27 RX ORDER — HYDROMORPHONE HYDROCHLORIDE 2 MG/ML
0.2 INJECTION, SOLUTION INTRAMUSCULAR; INTRAVENOUS; SUBCUTANEOUS EVERY 5 MIN PRN
Status: DISCONTINUED | OUTPATIENT
Start: 2024-06-27 | End: 2024-06-27 | Stop reason: HOSPADM

## 2024-06-27 RX ORDER — FENTANYL CITRATE 50 UG/ML
INJECTION, SOLUTION INTRAMUSCULAR; INTRAVENOUS
Status: DISCONTINUED | OUTPATIENT
Start: 2024-06-27 | End: 2024-06-27

## 2024-06-27 RX ORDER — HYDROCODONE BITARTRATE AND ACETAMINOPHEN 5; 325 MG/1; MG/1
1 TABLET ORAL EVERY 4 HOURS PRN
Status: DISCONTINUED | OUTPATIENT
Start: 2024-06-27 | End: 2024-07-02 | Stop reason: HOSPADM

## 2024-06-27 RX ORDER — MIDAZOLAM HYDROCHLORIDE 1 MG/ML
INJECTION INTRAMUSCULAR; INTRAVENOUS
Status: DISCONTINUED | OUTPATIENT
Start: 2024-06-27 | End: 2024-06-27

## 2024-06-27 RX ORDER — ACETAMINOPHEN 10 MG/ML
INJECTION, SOLUTION INTRAVENOUS
Status: DISCONTINUED | OUTPATIENT
Start: 2024-06-27 | End: 2024-06-27

## 2024-06-27 RX ORDER — PROPOFOL 10 MG/ML
VIAL (ML) INTRAVENOUS
Status: DISCONTINUED | OUTPATIENT
Start: 2024-06-27 | End: 2024-06-27

## 2024-06-27 RX ORDER — TRAMADOL HYDROCHLORIDE 50 MG/1
50 TABLET ORAL EVERY 4 HOURS PRN
Status: DISCONTINUED | OUTPATIENT
Start: 2024-06-27 | End: 2024-07-02 | Stop reason: HOSPADM

## 2024-06-27 RX ADMIN — CEFAZOLIN 2 G: 2 INJECTION, POWDER, FOR SOLUTION INTRAMUSCULAR; INTRAVENOUS at 06:06

## 2024-06-27 RX ADMIN — CLINDAMYCIN IN 5 PERCENT DEXTROSE 900 MG: 18 INJECTION, SOLUTION INTRAVENOUS at 08:06

## 2024-06-27 RX ADMIN — FENTANYL CITRATE 50 MCG: 0.05 INJECTION, SOLUTION INTRAMUSCULAR; INTRAVENOUS at 11:06

## 2024-06-27 RX ADMIN — SODIUM CHLORIDE: 9 INJECTION, SOLUTION INTRAVENOUS at 05:06

## 2024-06-27 RX ADMIN — METRONIDAZOLE 500 MG: 500 TABLET ORAL at 08:06

## 2024-06-27 RX ADMIN — KETOROLAC TROMETHAMINE 30 MG: 30 INJECTION, SOLUTION INTRAMUSCULAR; INTRAVENOUS at 11:06

## 2024-06-27 RX ADMIN — ACETAMINOPHEN 1000 MG: 10 INJECTION, SOLUTION INTRAVENOUS at 11:06

## 2024-06-27 RX ADMIN — FENTANYL CITRATE 50 MCG: 0.05 INJECTION, SOLUTION INTRAMUSCULAR; INTRAVENOUS at 10:06

## 2024-06-27 RX ADMIN — METRONIDAZOLE 500 MG: 500 TABLET ORAL at 03:06

## 2024-06-27 RX ADMIN — ONDANSETRON 4 MG: 2 INJECTION INTRAMUSCULAR; INTRAVENOUS at 10:06

## 2024-06-27 RX ADMIN — CEFAZOLIN 2 G: 2 INJECTION, POWDER, FOR SOLUTION INTRAMUSCULAR; INTRAVENOUS at 09:06

## 2024-06-27 RX ADMIN — SODIUM CHLORIDE: 9 INJECTION, SOLUTION INTRAVENOUS at 08:06

## 2024-06-27 RX ADMIN — LIDOCAINE HYDROCHLORIDE 100 MG: 20 INJECTION, SOLUTION INTRAVENOUS at 10:06

## 2024-06-27 RX ADMIN — OXYCODONE HYDROCHLORIDE 5 MG: 5 TABLET ORAL at 12:06

## 2024-06-27 RX ADMIN — DEXAMETHASONE SODIUM PHOSPHATE 4 MG: 4 INJECTION, SOLUTION INTRA-ARTICULAR; INTRALESIONAL; INTRAMUSCULAR; INTRAVENOUS; SOFT TISSUE at 10:06

## 2024-06-27 RX ADMIN — CEFEPIME 2 G: 2 INJECTION, POWDER, FOR SOLUTION INTRAVENOUS at 03:06

## 2024-06-27 RX ADMIN — CLINDAMYCIN IN 5 PERCENT DEXTROSE 900 MG: 18 INJECTION, SOLUTION INTRAVENOUS at 04:06

## 2024-06-27 RX ADMIN — VANCOMYCIN HYDROCHLORIDE 1500 MG: 1.5 INJECTION, POWDER, LYOPHILIZED, FOR SOLUTION INTRAVENOUS at 12:06

## 2024-06-27 RX ADMIN — CLINDAMYCIN IN 5 PERCENT DEXTROSE 900 MG: 18 INJECTION, SOLUTION INTRAVENOUS at 01:06

## 2024-06-27 RX ADMIN — PROPOFOL 200 MG: 10 INJECTION, EMULSION INTRAVENOUS at 10:06

## 2024-06-27 RX ADMIN — MIDAZOLAM HYDROCHLORIDE 2 MG: 1 INJECTION, SOLUTION INTRAMUSCULAR; INTRAVENOUS at 10:06

## 2024-06-27 RX ADMIN — CEFAZOLIN 2 G: 2 INJECTION, POWDER, FOR SOLUTION INTRAMUSCULAR; INTRAVENOUS at 10:06

## 2024-06-27 RX ADMIN — METRONIDAZOLE 500 MG: 500 TABLET ORAL at 05:06

## 2024-06-27 RX ADMIN — SODIUM CHLORIDE, SODIUM GLUCONATE, SODIUM ACETATE, POTASSIUM CHLORIDE AND MAGNESIUM CHLORIDE: 526; 502; 368; 37; 30 INJECTION, SOLUTION INTRAVENOUS at 09:06

## 2024-06-27 NOTE — SUBJECTIVE & OBJECTIVE
Interval History:  No acute events overnight.  Plan for OR today for I and D.  Pending culture results and ID recommendations.    Review of Systems  Objective:     Vital Signs (Most Recent):  Temp: 98.2 °F (36.8 °C) (06/27/24 1145)  Pulse: 80 (06/27/24 1215)  Resp: 20 (06/27/24 1215)  BP: (!) 153/71 (06/27/24 1215)  SpO2: 98 % (06/27/24 1215) Vital Signs (24h Range):  Temp:  [97.6 °F (36.4 °C)-98.6 °F (37 °C)] 98.2 °F (36.8 °C)  Pulse:  [68-82] 80  Resp:  [10-20] 20  SpO2:  [95 %-98 %] 98 %  BP: (105-155)/(55-92) 153/71     Weight: 113.4 kg (250 lb)  Body mass index is 38.01 kg/m².    Intake/Output Summary (Last 24 hours) at 6/27/2024 1231  Last data filed at 6/27/2024 0641  Gross per 24 hour   Intake 3208.29 ml   Output 1925 ml   Net 1283.29 ml         Physical Exam  Constitutional:       Appearance: Normal appearance.   HENT:      Head: Normocephalic and atraumatic.      Right Ear: External ear normal.      Left Ear: External ear normal.      Nose: Nose normal.      Mouth/Throat:      Mouth: Mucous membranes are moist.      Pharynx: Oropharynx is clear.   Eyes:      Extraocular Movements: Extraocular movements intact.      Conjunctiva/sclera: Conjunctivae normal.   Cardiovascular:      Rate and Rhythm: Normal rate and regular rhythm.      Pulses: Normal pulses.      Heart sounds: Normal heart sounds. No murmur heard.     No gallop.   Pulmonary:      Effort: Pulmonary effort is normal. No respiratory distress.      Breath sounds: Normal breath sounds. No wheezing, rhonchi or rales.   Abdominal:      General: Abdomen is flat. There is no distension.      Palpations: Abdomen is soft.      Tenderness: There is no abdominal tenderness.   Musculoskeletal:         General: No swelling. Normal range of motion.      Cervical back: Normal range of motion and neck supple.   Skin:     General: Skin is warm and dry.   Neurological:      General: No focal deficit present.      Mental Status: He is alert and oriented to person,  place, and time.             Significant Labs: All pertinent labs within the past 24 hours have been reviewed.    Significant Imaging: I have reviewed all pertinent imaging results/findings within the past 24 hours.

## 2024-06-27 NOTE — OP NOTE
Operative Report     Patient name: Salvador Bermudez   MRN: [id number]  Date of surgery: 6/27/2024    Surgeon: Jaime Green DPM   Assistant:  None    Preoperative diagnosis:  1.  Grade 3 ulcer left foot 2.  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  Excision of 5th metatarsal head left foot 2.  Delayed layered primary closure left foot    Anesthesia:  General  Hemostasis:  Pneumatic ankle tourniquet at 250 mmHg  Estimated blood loss:  5 mL   Specimen:  1.  Bone 5th metatarsal for culture 2.  Bone clean margin 5th metatarsal for culture  Complications: None  Condition upon discharge: Stable    Procedure in detail:  Patient was brought the operating room placed the operating table in a supine position.  Following induction general anesthesia a well-padded pneumatic ankle tourniquet was placed around the patient's left ankle and set at 250 mmHg.  The left foot was then prepped scrubbed draped normal aseptic manner.  A time-out was then called.  An Esmarch bandage was utilized to exsanguinate the left foot and left pneumatic ankle tourniquet was inflated to 250 mmHg.  At this time attention was directed to the lateral aspect of the patient's left foot were then no to be full-thickness ulcerations both on the dorsal and plantar aspect at the level of the 5th metatarsal head.  These were noted to track through and through.  Small amount purulence was able to be expressed from the plantar ulceration.  At this time the dorsal wound was enlarged utilizing a 15 blade.  Forceps 15 blade and a rongeur was utilized to excise some necrotic tissue from the area.  Blunt dissection was then carried down to fully connect an open up the 2 wounds.  Small amount of drainage was encountered and this was fully drained.  The 5th metatarsal head was then visualized.  It was noted to be somewhat softened and dystrophic.  At this time utilizing a sagittal saw and an osteotome the distal 5th  metatarsal was resected.  This was then passed from the operating field and sent for culture.  The wound was then copiously irrigated with sterile saline.  At this time utilizing clean sterile instruments a clean margin culture from the remaining 5th metatarsal was obtained and passed from the field.  Bleeders were then cauterized as necessary.  At this time the wound was inspected and the tissues remaining around the joint did appear relatively healthy.  No additional abscesses were seen.  This time it was decided to do a delayed primary closure of the dorsal and plantar wounds.  The plantar wound was slightly enlarged and ellipse to allow for better primary closure.  The dorsal wound was closed utilizing 3-0 Vicryl for the subcutaneous tissues and 3-0 Prolene for skin.  The plantar wound was closed utilizing 3-0 Vicryl for subcutaneous and 2-0 Prolene for skin.  15 cc of 0.25% Marcaine plain was injected about the surgical site.  The foot was then dressed with Adaptic 4x4s ABD pad Kerlix and an Ace wrap.  The pneumatic ankle tourniquet was deflated and neurovascular status noted be intact to the left foot and digits.  Patient's left foot was placed in a postoperative Cam Walker boot.  Patient tolerated the procedure well.  He had anesthesia reversed and left the operating Room stable vitals.

## 2024-06-27 NOTE — PLAN OF CARE
Pt prepped for surgery. Consents at bedside. Incentive spirometry taught by respiratory. Pt belongings left in pt room. Wife set up with text alerts. Cell phone given to wife.

## 2024-06-27 NOTE — ANESTHESIA PREPROCEDURE EVALUATION
06/27/2024  Salvador Bermudez is a 54 y.o., male.      Pre-op Assessment    I have reviewed the Patient Summary Reports.     I have reviewed the Nursing Notes.       Review of Systems  Anesthesia Hx:  No problems with previous Anesthesia                Cardiovascular:  Cardiovascular Normal                                            Pulmonary:  Pulmonary Normal                           Physical Exam  General: Well nourished        Anesthesia Plan  Type of Anesthesia, risks & benefits discussed:    Anesthesia Type: Gen Supraglottic Airway  Intra-op Monitoring Plan: Standard ASA Monitors  Post Op Pain Control Plan: multimodal analgesia and IV/PO Opioids PRN  Induction:  IV  Informed Consent: Informed consent signed with the Patient and all parties understand the risks and agree with anesthesia plan.  All questions answered.   ASA Score: 2  Day of Surgery Review of History & Physical: H&P Update referred to the surgeon/provider.    Ready For Surgery From Anesthesia Perspective.     .

## 2024-06-27 NOTE — PROGRESS NOTES
Salvador Bermudez [id number] is a 54 y.o. male who has been consulted for vancomycin dosing.    Pharmacy consult for vancomycin dosing in no longer required.  Vancomycin was discontinued.    Thank you for allowing us to participate in this patient's care.     Halle MendozaD

## 2024-06-27 NOTE — ANESTHESIA POSTPROCEDURE EVALUATION
Anesthesia Post Evaluation    Patient: Salvador Bermudez    Procedure(s) Performed: Procedure(s) (LRB):  DEBRIDEMENT, FOOT (Left)    Final Anesthesia Type: general      Patient location during evaluation: PACU  Patient participation: Yes- Able to Participate  Level of consciousness: awake and alert  Post-procedure vital signs: reviewed and stable  Pain management: adequate  Airway patency: patent    PONV status at discharge: No PONV  Anesthetic complications: no      Cardiovascular status: blood pressure returned to baseline  Respiratory status: unassisted and room air  Hydration status: euvolemic  Follow-up not needed.              Vitals Value Taken Time   /72 06/27/24 1237   Temp 36.9 °C (98.4 °F) 06/27/24 1230   Pulse 74 06/27/24 1237   Resp 14 06/27/24 1237   SpO2 97 % 06/27/24 1237   Vitals shown include unfiled device data.      No case tracking events are documented in the log.      Pain/Yoli Score: Pain Rating Prior to Med Admin: 2 (6/27/2024 12:13 PM)  Yoli Score: 10 (6/27/2024 12:30 PM)

## 2024-06-27 NOTE — ASSESSMENT & PLAN NOTE
-Podiatry Consult, appreciate recommendations plan for I and D on 06/27/2024  -wound care consult  -MRI concerning for abscess and osteo  -following cultures  -continue IV antibiotics per ID recommendations  -analgesic prn  -reviewed labs

## 2024-06-27 NOTE — PLAN OF CARE
VSS. NAD. Resp E/U. Calm and cooperative. Speech appropriate. Tolerating clear liquids. Denies nausea. Pain controlled. IV patent and SL. Dressing to Lt foot CDI. Boot on lt foot intact and foot elevated with a pillow. Family notified of patient status. All safety measures taken. Bed in low position. Bedrails up x2. Bed locked. Cleared by Anesthesia.

## 2024-06-27 NOTE — TRANSFER OF CARE
"Anesthesia Transfer of Care Note    Patient: Salvador Bermudez    Procedure(s) Performed: Procedure(s) (LRB):  DEBRIDEMENT, FOOT (Left)    Patient location: PACU    Anesthesia Type: general    Transport from OR: Transported from OR on 6-10 L/min O2 by face mask with adequate spontaneous ventilation    Post pain: adequate analgesia    Post assessment: no apparent anesthetic complications and tolerated procedure well    Post vital signs: stable    Level of consciousness: sedated    Nausea/Vomiting: no nausea/vomiting    Complications: none    Transfer of care protocol was followed    Last vitals: Visit Vitals  BP (!) 144/85 (BP Location: Left arm, Patient Position: Lying)   Pulse 70   Temp 36.7 °C (98.1 °F) (Temporal)   Resp 18   Ht 5' 8" (1.727 m)   Wt 113.4 kg (250 lb)   SpO2 97%   BMI 38.01 kg/m²     "

## 2024-06-27 NOTE — ANESTHESIA PROCEDURE NOTES
Intubation    Date/Time: 6/27/2024 10:39 AM    Performed by: Syd Dwyer CRNA  Authorized by: Syd Dwyer CRNA    Intubation:     Induction:  Intravenous    Intubated:  Postinduction    Mask Ventilation:  Easy mask    Attempts:  1    Attempted By:  CRNA    Difficult Airway Encountered?: No      Complications:  None    Airway Device:  Supraglottic airway/LMA    Airway Device Size:  4.0    Style/Cuff Inflation:  Cuffed (inflated to minimal occlusive pressure)    Placement Verified By:  Capnometry    Complicating Factors:  None    Findings Post-Intubation:  BS equal bilateral

## 2024-06-27 NOTE — PLAN OF CARE
Problem: Adult Inpatient Plan of Care  Goal: Plan of Care Review  Outcome: Progressing  Goal: Patient-Specific Goal (Individualized)  Outcome: Progressing  Goal: Optimal Comfort and Wellbeing  Outcome: Progressing     Problem: Infection  Goal: Absence of Infection Signs and Symptoms  Outcome: Progressing     Problem: Wound  Goal: Optimal Coping  Outcome: Progressing  Goal: Optimal Functional Ability  Outcome: Progressing  Goal: Absence of Infection Signs and Symptoms  Outcome: Progressing  Goal: Optimal Pain Control and Function  Outcome: Progressing  Goal: Skin Health and Integrity  Outcome: Progressing  Goal: Optimal Wound Healing  Outcome: Progressing     Problem: Pain Acute  Goal: Optimal Pain Control and Function  Outcome: Progressing     Problem: Fall Injury Risk  Goal: Absence of Fall and Fall-Related Injury  Outcome: Progressing     Problem: Skin or Soft Tissue Infection  Goal: Absence of Infection Signs and Symptoms  Outcome: Progressing

## 2024-06-27 NOTE — PLAN OF CARE
Problem: Adult Inpatient Plan of Care  Goal: Plan of Care Review  Outcome: Progressing     Problem: Adult Inpatient Plan of Care  Goal: Absence of Hospital-Acquired Illness or Injury  Outcome: Progressing     Problem: Infection  Goal: Absence of Infection Signs and Symptoms  Outcome: Progressing     Problem: Wound  Goal: Optimal Coping  Outcome: Progressing     Problem: Wound  Goal: Optimal Functional Ability  Outcome: Progressing     Problem: Pain Acute  Goal: Optimal Pain Control and Function  Outcome: Progressing     Problem: Fall Injury Risk  Goal: Absence of Fall and Fall-Related Injury  Outcome: Progressing     Problem: Skin or Soft Tissue Infection  Goal: Absence of Infection Signs and Symptoms  Outcome: Progressing

## 2024-06-27 NOTE — CONSULTS
18 gauge x 10cm Midline placed to patient's right brachial vein with the use of ultrasound guidance.     Ultrasound guidance: yes  Vessel Caliber: large and patent, compressibility normal  Needle advanced into vessel with real time Ultrasound guidance.  Guidewire confirmed in vessel.  Image recorded and saved.  Sterile sheath used.  Sterile dressing applied  Arm circumference- 39cm  Dressing dated   Limb alert applied.

## 2024-06-27 NOTE — PROGRESS NOTES
[facility] Medicine  Progress Note    Patient Name: Salvador Bermudez  MRN: [id number]  Patient Class: IP- Inpatient   Admission Date: 6/24/2024  Length of Stay: 2 days  Attending Physician: Aníbal Morrison Jr., MD  Primary Care Provider: Lyubov, Primary Doctor        Subjective:     Principal Problem:Cellulitis of left foot        HPI:  Salvador Bermudez is a 54 year old male with a past medical history of obesity who presents with complaints of left foot wound which started 3 months ago however worsening pain, swelling, and redness over the past 3 days. He reports 3 months ago he had a blister to the left foot after wearing a pair of new shoes while on a cruise. He began treatment with wound care however his left foot is more swollen.  ED workup:  Left foot x-ray concerning for possible bony involvement at the base of the left metatarsal.  CBC and BMP unremarkable. Lactic acid 1.2 and  ESR  pending. Started on vancomycin in ED. Left foot MRI pending. Podiatry consult placed.  Admitted to hospital medicine for further management and treatment.                        Overview/Hospital Course:  No notes on file    Interval History:  No acute events overnight.  Plan for OR today for I and D.  Pending culture results and ID recommendations.    Review of Systems  Objective:     Vital Signs (Most Recent):  Temp: 98.2 °F (36.8 °C) (06/27/24 1145)  Pulse: 80 (06/27/24 1215)  Resp: 20 (06/27/24 1215)  BP: (!) 153/71 (06/27/24 1215)  SpO2: 98 % (06/27/24 1215) Vital Signs (24h Range):  Temp:  [97.6 °F (36.4 °C)-98.6 °F (37 °C)] 98.2 °F (36.8 °C)  Pulse:  [68-82] 80  Resp:  [10-20] 20  SpO2:  [95 %-98 %] 98 %  BP: (105-155)/(55-92) 153/71     Weight: 113.4 kg (250 lb)  Body mass index is 38.01 kg/m².    Intake/Output Summary (Last 24 hours) at 6/27/2024 1231  Last data filed at 6/27/2024 0641  Gross per 24 hour   Intake 3208.29 ml   Output 1925 ml   Net 1283.29 ml         Physical  Exam  Constitutional:       Appearance: Normal appearance.   HENT:      Head: Normocephalic and atraumatic.      Right Ear: External ear normal.      Left Ear: External ear normal.      Nose: Nose normal.      Mouth/Throat:      Mouth: Mucous membranes are moist.      Pharynx: Oropharynx is clear.   Eyes:      Extraocular Movements: Extraocular movements intact.      Conjunctiva/sclera: Conjunctivae normal.   Cardiovascular:      Rate and Rhythm: Normal rate and regular rhythm.      Pulses: Normal pulses.      Heart sounds: Normal heart sounds. No murmur heard.     No gallop.   Pulmonary:      Effort: Pulmonary effort is normal. No respiratory distress.      Breath sounds: Normal breath sounds. No wheezing, rhonchi or rales.   Abdominal:      General: Abdomen is flat. There is no distension.      Palpations: Abdomen is soft.      Tenderness: There is no abdominal tenderness.   Musculoskeletal:         General: No swelling. Normal range of motion.      Cervical back: Normal range of motion and neck supple.   Skin:     General: Skin is warm and dry.   Neurological:      General: No focal deficit present.      Mental Status: He is alert and oriented to person, place, and time.             Significant Labs: All pertinent labs within the past 24 hours have been reviewed.    Significant Imaging: I have reviewed all pertinent imaging results/findings within the past 24 hours.    Assessment/Plan:      * Cellulitis of left foot  -Podiatry Consult, appreciate recommendations plan for I and D on 06/27/2024  -wound care consult  -MRI concerning for abscess and osteo  -following cultures  -continue IV antibiotics per ID recommendations  -analgesic prn  -reviewed labs      Obesity (BMI 35.0-39.9 without comorbidity)  Body mass index is 37.24 kg/m². Morbid obesity complicates all aspects of disease management from diagnostic modalities to treatment. Weight loss encouraged and health benefits explained to patient.           VTE  Risk Mitigation (From admission, onward)           Ordered     IP VTE HIGH RISK PATIENT  Once         06/27/24 1125     Place sequential compression device  Until discontinued         06/27/24 1125     Place sequential compression device  Until discontinued         06/25/24 0143                    Discharge Planning   BECKY:      Code Status: Full Code   Is the patient medically ready for discharge?:     Reason for patient still in hospital (select all that apply): Treatment  Discharge Plan A: Home with family                  Aníbal Morrison Jr, MD  Department of Hospital Medicine   [facility]

## 2024-06-28 LAB
ALBUMIN SERPL BCP-MCNC: 2.8 G/DL (ref 3.5–5.2)
ALP SERPL-CCNC: 53 U/L (ref 55–135)
ALT SERPL W/O P-5'-P-CCNC: 57 U/L (ref 10–44)
ANION GAP SERPL CALC-SCNC: 9 MMOL/L (ref 8–16)
AST SERPL-CCNC: 33 U/L (ref 10–40)
BACTERIA SPEC ANAEROBE CULT: NORMAL
BASOPHILS # BLD AUTO: 0.03 K/UL (ref 0–0.2)
BASOPHILS NFR BLD: 0.3 % (ref 0–1.9)
BILIRUB SERPL-MCNC: 0.2 MG/DL (ref 0.1–1)
BUN SERPL-MCNC: 12 MG/DL (ref 6–20)
CALCIUM SERPL-MCNC: 8.9 MG/DL (ref 8.7–10.5)
CHLORIDE SERPL-SCNC: 106 MMOL/L (ref 95–110)
CO2 SERPL-SCNC: 24 MMOL/L (ref 23–29)
CREAT SERPL-MCNC: 0.8 MG/DL (ref 0.5–1.4)
DIFFERENTIAL METHOD BLD: ABNORMAL
EOSINOPHIL # BLD AUTO: 0 K/UL (ref 0–0.5)
EOSINOPHIL NFR BLD: 0.2 % (ref 0–8)
ERYTHROCYTE [DISTWIDTH] IN BLOOD BY AUTOMATED COUNT: 11.5 % (ref 11.5–14.5)
EST. GFR  (NO RACE VARIABLE): >60 ML/MIN/1.73 M^2
GLUCOSE SERPL-MCNC: 144 MG/DL (ref 70–110)
HCT VFR BLD AUTO: 41 % (ref 40–54)
HGB BLD-MCNC: 13.9 G/DL (ref 14–18)
IMM GRANULOCYTES # BLD AUTO: 0.05 K/UL (ref 0–0.04)
IMM GRANULOCYTES NFR BLD AUTO: 0.6 % (ref 0–0.5)
LYMPHOCYTES # BLD AUTO: 1.5 K/UL (ref 1–4.8)
LYMPHOCYTES NFR BLD: 16.8 % (ref 18–48)
MCH RBC QN AUTO: 31.7 PG (ref 27–31)
MCHC RBC AUTO-ENTMCNC: 33.9 G/DL (ref 32–36)
MCV RBC AUTO: 94 FL (ref 82–98)
MONOCYTES # BLD AUTO: 0.6 K/UL (ref 0.3–1)
MONOCYTES NFR BLD: 6.4 % (ref 4–15)
NEUTROPHILS # BLD AUTO: 6.9 K/UL (ref 1.8–7.7)
NEUTROPHILS NFR BLD: 75.7 % (ref 38–73)
NRBC BLD-RTO: 0 /100 WBC
PLATELET # BLD AUTO: 342 K/UL (ref 150–450)
PMV BLD AUTO: 9.1 FL (ref 9.2–12.9)
POTASSIUM SERPL-SCNC: 4.1 MMOL/L (ref 3.5–5.1)
PROT SERPL-MCNC: 6.4 G/DL (ref 6–8.4)
RBC # BLD AUTO: 4.38 M/UL (ref 4.6–6.2)
SODIUM SERPL-SCNC: 139 MMOL/L (ref 136–145)
WBC # BLD AUTO: 9.06 K/UL (ref 3.9–12.7)

## 2024-06-28 PROCEDURE — 94761 N-INVAS EAR/PLS OXIMETRY MLT: CPT

## 2024-06-28 PROCEDURE — 94799 UNLISTED PULMONARY SVC/PX: CPT

## 2024-06-28 PROCEDURE — 80053 COMPREHEN METABOLIC PANEL: CPT

## 2024-06-28 PROCEDURE — 36415 COLL VENOUS BLD VENIPUNCTURE: CPT

## 2024-06-28 PROCEDURE — 99233 SBSQ HOSP IP/OBS HIGH 50: CPT | Mod: ,,, | Performed by: STUDENT IN AN ORGANIZED HEALTH CARE EDUCATION/TRAINING PROGRAM

## 2024-06-28 PROCEDURE — 85025 COMPLETE CBC W/AUTO DIFF WBC: CPT

## 2024-06-28 PROCEDURE — 25000003 PHARM REV CODE 250: Performed by: STUDENT IN AN ORGANIZED HEALTH CARE EDUCATION/TRAINING PROGRAM

## 2024-06-28 PROCEDURE — 25000003 PHARM REV CODE 250

## 2024-06-28 PROCEDURE — 11000001 HC ACUTE MED/SURG PRIVATE ROOM

## 2024-06-28 PROCEDURE — 63600175 PHARM REV CODE 636 W HCPCS: Mod: JZ,JG | Performed by: STUDENT IN AN ORGANIZED HEALTH CARE EDUCATION/TRAINING PROGRAM

## 2024-06-28 RX ADMIN — METRONIDAZOLE 500 MG: 500 TABLET ORAL at 06:06

## 2024-06-28 RX ADMIN — SODIUM CHLORIDE: 9 INJECTION, SOLUTION INTRAVENOUS at 07:06

## 2024-06-28 RX ADMIN — CEFAZOLIN 2 G: 2 INJECTION, POWDER, FOR SOLUTION INTRAMUSCULAR; INTRAVENOUS at 04:06

## 2024-06-28 RX ADMIN — CEFAZOLIN 2 G: 2 INJECTION, POWDER, FOR SOLUTION INTRAMUSCULAR; INTRAVENOUS at 09:06

## 2024-06-28 RX ADMIN — CEFAZOLIN 2 G: 2 INJECTION, POWDER, FOR SOLUTION INTRAMUSCULAR; INTRAVENOUS at 03:06

## 2024-06-28 RX ADMIN — SODIUM CHLORIDE: 9 INJECTION, SOLUTION INTRAVENOUS at 03:06

## 2024-06-28 RX ADMIN — CLINDAMYCIN IN 5 PERCENT DEXTROSE 900 MG: 18 INJECTION, SOLUTION INTRAVENOUS at 03:06

## 2024-06-28 NOTE — SUBJECTIVE & OBJECTIVE
Subjective:     Interval History: Patient resting in bed. Denies complaints of pain to either extremity. Denies any new complaints.   Dressing intact.   No strike through bleeding.     Follow-up For: Procedure(s) (LRB):  DEBRIDEMENT, FOOT (Left)    Post-Operative Day: 1 Day Post-Op    Scheduled Meds:   ceFAZolin (Ancef) IV (PEDS and ADULTS)  2 g Intravenous Q6H     Continuous Infusions:   0.9% NaCl   Intravenous Continuous 100 mL/hr at 06/28/24 1538 New Bag at 06/28/24 1538     PRN Meds:  Current Facility-Administered Medications:     acetaminophen, 650 mg, Oral, Q4H PRN    aluminum-magnesium hydroxide-simethicone, 30 mL, Oral, QID PRN    dextrose 10%, 12.5 g, Intravenous, PRN    dextrose 10%, 25 g, Intravenous, PRN    glucagon (human recombinant), 1 mg, Intramuscular, PRN    glucose, 16 g, Oral, PRN    glucose, 24 g, Oral, PRN    HYDROcodone-acetaminophen, 1 tablet, Oral, Q4H PRN    HYDROcodone-acetaminophen, 1 tablet, Oral, Q4H PRN    magnesium oxide, 800 mg, Oral, PRN    magnesium oxide, 800 mg, Oral, PRN    naloxone, 0.02 mg, Intravenous, PRN    ondansetron, 8 mg, Oral, Q8H PRN    potassium bicarbonate, 35 mEq, Oral, PRN    potassium bicarbonate, 50 mEq, Oral, PRN    potassium bicarbonate, 60 mEq, Oral, PRN    prochlorperazine, 5 mg, Intravenous, Q6H PRN    promethazine, 25 mg, Oral, Q6H PRN    senna-docusate 8.6-50 mg, 1 tablet, Oral, BID PRN    sodium chloride 0.9%, 10 mL, Intravenous, Q12H PRN    sodium chloride 0.9%, 10 mL, Intravenous, PRN    traMADoL, 50 mg, Oral, Q4H PRN    trazodone, 100 mg, Oral, Nightly PRN    Review of Systems  Objective:     Vital Signs (Most Recent):  Temp: 98.1 °F (36.7 °C) (06/28/24 1144)  Pulse: 72 (06/28/24 1144)  Resp: 18 (06/28/24 1350)  BP: (!) 145/87 (06/28/24 1144)  SpO2: 97 % (06/28/24 1144) Vital Signs (24h Range):  Temp:  [97.9 °F (36.6 °C)-98.4 °F (36.9 °C)] 98.1 °F (36.7 °C)  Pulse:  [70-96] 72  Resp:  [16-19] 18  SpO2:  [95 %-98 %] 97 %  BP: (124-151)/(71-87) 145/87      Weight: 115.8 kg (255 lb 4.7 oz)  Body mass index is 38.82 kg/m².    Foot Exam                No signs of dehiscence.    Laboratory:  All pertinent labs reviewed within the last 24 hours.    Diagnostic Results:  I have reviewed all pertinent imaging results/findings within the past 24 hours.

## 2024-06-28 NOTE — PROGRESS NOTES
[facility]/Surg  Podiatry  Progress Note    Patient Name: Salvador Bermudez  MRN: [id number]  Admission Date: 6/24/2024  Hospital Length of Stay: 3 days  Attending Physician: Aníbal Morrison Jr., MD  Primary Care Provider: Lyubov Primary Doctor     Subjective:     Interval History: Patient resting in bed. Denies complaints of pain to either extremity. Denies any new complaints.   Dressing intact.   No strike through bleeding.     Follow-up For: Procedure(s) (LRB):  DEBRIDEMENT, FOOT (Left)    Post-Operative Day: 1 Day Post-Op    Scheduled Meds:   ceFAZolin (Ancef) IV (PEDS and ADULTS)  2 g Intravenous Q6H     Continuous Infusions:   0.9% NaCl   Intravenous Continuous 100 mL/hr at 06/28/24 1538 New Bag at 06/28/24 1538     PRN Meds:  Current Facility-Administered Medications:     acetaminophen, 650 mg, Oral, Q4H PRN    aluminum-magnesium hydroxide-simethicone, 30 mL, Oral, QID PRN    dextrose 10%, 12.5 g, Intravenous, PRN    dextrose 10%, 25 g, Intravenous, PRN    glucagon (human recombinant), 1 mg, Intramuscular, PRN    glucose, 16 g, Oral, PRN    glucose, 24 g, Oral, PRN    HYDROcodone-acetaminophen, 1 tablet, Oral, Q4H PRN    HYDROcodone-acetaminophen, 1 tablet, Oral, Q4H PRN    magnesium oxide, 800 mg, Oral, PRN    magnesium oxide, 800 mg, Oral, PRN    naloxone, 0.02 mg, Intravenous, PRN    ondansetron, 8 mg, Oral, Q8H PRN    potassium bicarbonate, 35 mEq, Oral, PRN    potassium bicarbonate, 50 mEq, Oral, PRN    potassium bicarbonate, 60 mEq, Oral, PRN    prochlorperazine, 5 mg, Intravenous, Q6H PRN    promethazine, 25 mg, Oral, Q6H PRN    senna-docusate 8.6-50 mg, 1 tablet, Oral, BID PRN    sodium chloride 0.9%, 10 mL, Intravenous, Q12H PRN    sodium chloride 0.9%, 10 mL, Intravenous, PRN    traMADoL, 50 mg, Oral, Q4H PRN    trazodone, 100 mg, Oral, Nightly PRN    Review of Systems  Objective:     Vital Signs (Most Recent):  Temp: 98.1 °F (36.7 °C) (06/28/24 1144)  Pulse: 72 (06/28/24 1144)  Resp: 18  (06/28/24 1350)  BP: (!) 145/87 (06/28/24 1144)  SpO2: 97 % (06/28/24 1144) Vital Signs (24h Range):  Temp:  [97.9 °F (36.6 °C)-98.4 °F (36.9 °C)] 98.1 °F (36.7 °C)  Pulse:  [70-96] 72  Resp:  [16-19] 18  SpO2:  [95 %-98 %] 97 %  BP: (124-151)/(71-87) 145/87     Weight: 115.8 kg (255 lb 4.7 oz)  Body mass index is 38.82 kg/m².    Foot Exam                No signs of dehiscence.    Laboratory:  All pertinent labs reviewed within the last 24 hours.    Diagnostic Results:  I have reviewed all pertinent imaging results/findings within the past 24 hours.  Assessment/Plan:     ID  * Cellulitis of left foot  Patient will need to be non weight bearing to the left foot, until sutures are removed in approximately 3 weeks. Patient can weightbear in [place] for transfers only    Vikash and protein drinks ordered    Continue antibiotics per ID recommendations    Patient to follow up outpatient in podiatry once discharged     Counseled patient on increasing protein intake, not getting wound wet, keeping dressing clean dry and intact, following a healthy diet, elevating legs when able, removing pressure from wound             Trinity Green DPM  Podiatry  [facility]/Surg

## 2024-06-28 NOTE — PLAN OF CARE
Problem: Adult Inpatient Plan of Care  Goal: Plan of Care Review  Outcome: Progressing  Flowsheets (Taken 6/28/2024 1044)  Plan of Care Reviewed With: patient  Goal: Optimal Comfort and Wellbeing  Outcome: Progressing  Intervention: Monitor Pain and Promote Comfort  Flowsheets (Taken 6/28/2024 1044)  Pain Management Interventions:   around-the-clock dosing utilized   care clustered   quiet environment facilitated   relaxation techniques promoted     Problem: Infection  Goal: Absence of Infection Signs and Symptoms  Outcome: Progressing  Intervention: Prevent or Manage Infection  Flowsheets (Taken 6/28/2024 1044)  Fever Reduction/Comfort Measures: fluid intake increased  Infection Management: aseptic technique maintained     Problem: Wound  Goal: Skin Health and Integrity  Outcome: Progressing  Intervention: Optimize Skin Protection  Flowsheets (Taken 6/28/2024 1044)  Pressure Reduction Techniques:   frequent weight shift encouraged   heels elevated off bed  Activity Management: Arm raise - L1  Head of Bed (HOB) Positioning: HOB elevated  Goal: Optimal Wound Healing  Outcome: Progressing     Problem: Fall Injury Risk  Goal: Absence of Fall and Fall-Related Injury  Outcome: Progressing  Intervention: Identify and Manage Contributors  Flowsheets (Taken 6/28/2024 1044)  Self-Care Promotion:   independence encouraged   BADL personal objects within reach   BADL personal routines maintained   adaptive equipment use encouraged  Medication Review/Management: medications reviewed

## 2024-06-28 NOTE — ASSESSMENT & PLAN NOTE
Patient will need to be non weight bearing to the left foot, until sutures are removed in approximately 3 weeks. Patient can weightbear in [place] for transfers only    Vikash and protein drinks ordered    Continue antibiotics per ID recommendations    Patient to follow up outpatient in podiatry once discharged     Counseled patient on increasing protein intake, not getting wound wet, keeping dressing clean dry and intact, following a healthy diet, elevating legs when able, removing pressure from wound

## 2024-06-28 NOTE — PLAN OF CARE
Problem: Adult Inpatient Plan of Care  Goal: Plan of Care Review  Outcome: Progressing  Goal: Patient-Specific Goal (Individualized)  Outcome: Progressing  Goal: Optimal Comfort and Wellbeing  Outcome: Progressing     Problem: Infection  Goal: Absence of Infection Signs and Symptoms  Outcome: Progressing     Problem: Wound  Goal: Absence of Infection Signs and Symptoms  Outcome: Progressing  Goal: Optimal Pain Control and Function  Outcome: Progressing  Goal: Skin Health and Integrity  Outcome: Progressing  Goal: Optimal Wound Healing  Outcome: Progressing     Problem: Pain Acute  Goal: Optimal Pain Control and Function  Outcome: Progressing     Problem: Fall Injury Risk  Goal: Absence of Fall and Fall-Related Injury  Outcome: Progressing     Problem: Skin or Soft Tissue Infection  Goal: Absence of Infection Signs and Symptoms  Outcome: Progressing     Problem: Mobility Impairment  Goal: Optimal Mobility  Outcome: Progressing

## 2024-06-28 NOTE — ASSESSMENT & PLAN NOTE
-Podiatry Consult, appreciate recommendations plan for I and D on 06/27/2024  -wound care consult  -MRI concerning for abscess and osteo  -following cultures  -continue IV antibiotics per ID recommendations  -analgesic prn  -reviewed labs  Working with case management to come up with plan for IV antibiotics

## 2024-06-28 NOTE — PROGRESS NOTES
[facility] Medicine  Progress Note    Patient Name: Salvador Bermudez  MRN: [id number]  Patient Class: IP- Inpatient   Admission Date: 6/24/2024  Length of Stay: 3 days  Attending Physician: Aníbal Morrison Jr., MD  Primary Care Provider: Lyubov, Primary Doctor        Subjective:     Principal Problem:Cellulitis of left foot        HPI:  Salvador Bermudez is a 54 year old male with a past medical history of obesity who presents with complaints of left foot wound which started 3 months ago however worsening pain, swelling, and redness over the past 3 days. He reports 3 months ago he had a blister to the left foot after wearing a pair of new shoes while on a cruise. He began treatment with wound care however his left foot is more swollen.  ED workup:  Left foot x-ray concerning for possible bony involvement at the base of the left metatarsal.  CBC and BMP unremarkable. Lactic acid 1.2 and  ESR  pending. Started on vancomycin in ED. Left foot MRI pending. Podiatry consult placed.  Admitted to hospital medicine for further management and treatment.                        Overview/Hospital Course:  Patient presented with left foot wound was found to have osteo and abscess.  Underwent I&D.  Currently getting IV antibiotics.  Patient is growing MSSA.  Following ID recommendations.  Working with case management to come up with plan for outpatient antibiotics.  Patient currently uninsured.    Interval History:  No acute events overnight.  Patient states he is okay after surgery.  Cultures growing MSSA.  Patient will need least 6 weeks of IV antibiotics.  Patient is uninsured, so working with case management for plan    Review of Systems  Objective:     Vital Signs (Most Recent):  Temp: 98 °F (36.7 °C) (06/28/24 0743)  Pulse: 70 (06/28/24 0743)  Resp: 18 (06/28/24 0743)  BP: (!) 151/87 (06/28/24 0743)  SpO2: 95 % (06/28/24 0743) Vital Signs (24h Range):  Temp:  [97.8 °F (36.6 °C)-98.4 °F (36.9 °C)] 98 °F  (36.7 °C)  Pulse:  [70-96] 70  Resp:  [10-20] 18  SpO2:  [95 %-98 %] 95 %  BP: (105-153)/(55-87) 151/87     Weight: 115.8 kg (255 lb 4.7 oz)  Body mass index is 38.82 kg/m².    Intake/Output Summary (Last 24 hours) at 6/28/2024 1050  Last data filed at 6/28/2024 0844  Gross per 24 hour   Intake 3471.92 ml   Output 2350 ml   Net 1121.92 ml         Physical Exam  Constitutional:       Appearance: Normal appearance.   HENT:      Head: Normocephalic and atraumatic.      Right Ear: External ear normal.      Left Ear: External ear normal.      Nose: Nose normal.      Mouth/Throat:      Mouth: Mucous membranes are moist.      Pharynx: Oropharynx is clear.   Eyes:      Extraocular Movements: Extraocular movements intact.      Conjunctiva/sclera: Conjunctivae normal.   Cardiovascular:      Rate and Rhythm: Normal rate and regular rhythm.      Pulses: Normal pulses.      Heart sounds: Normal heart sounds. No murmur heard.     No gallop.   Pulmonary:      Effort: Pulmonary effort is normal. No respiratory distress.      Breath sounds: Normal breath sounds. No wheezing, rhonchi or rales.   Abdominal:      General: Abdomen is flat. There is no distension.      Palpations: Abdomen is soft.      Tenderness: There is no abdominal tenderness.   Musculoskeletal:         General: No swelling. Normal range of motion.      Cervical back: Normal range of motion and neck supple.      Comments: Foot bandaged   Skin:     General: Skin is warm and dry.   Neurological:      General: No focal deficit present.      Mental Status: He is alert and oriented to person, place, and time.             Significant Labs: All pertinent labs within the past 24 hours have been reviewed.    Significant Imaging: I have reviewed all pertinent imaging results/findings within the past 24 hours.    Assessment/Plan:      * Cellulitis of left foot  -Podiatry Consult, appreciate recommendations plan for I and D on 06/27/2024  -wound care consult  -MRI concerning for  abscess and osteo  -following cultures  -continue IV antibiotics per ID recommendations  -analgesic prn  -reviewed labs  Working with case management to come up with plan for IV antibiotics      Obesity (BMI 35.0-39.9 without comorbidity)  Body mass index is 37.24 kg/m². Morbid obesity complicates all aspects of disease management from diagnostic modalities to treatment. Weight loss encouraged and health benefits explained to patient.           VTE Risk Mitigation (From admission, onward)           Ordered     IP VTE HIGH RISK PATIENT  Once         06/27/24 1125     Place sequential compression device  Until discontinued         06/27/24 1125                    Discharge Planning   BECKY:      Code Status: Full Code   Is the patient medically ready for discharge?:     Reason for patient still in hospital (select all that apply): Treatment  Discharge Plan A: Home with family                  Aníbal Morrison Jr, MD  Department of Hospital Medicine   [place]/Surg

## 2024-06-28 NOTE — PROGRESS NOTES
"[facility]    Department of Infectious Disease  Progress Note        PATIENT NAME: Salvador Bermudez  MRN: [id number]  TODAY'S DATE: 06/28/2024  ADMIT DATE: 6/24/2024  LOS: 3 days    CHIEF COMPLAINT: Wound Check (Left foot wound, started 3 months ago after wearing a new pair of shoes on a cruise. )    PRINCIPLE PROBLEM: Cellulitis of left foot    REASON FOR CONSULT:  Osteo    INTERVAL HISTORY     6/28/24 (Chad):  Patient seen and examined, had I&D and washout in the OR yesterday by Podiatry.  As per operation note: Full-thickness ulcerations both on the dorsal and plantar aspect at the level of the 5th metatarsal head. These were noted to track through and through. Small amount purulence was able to be expressed from the plantar ulceration. Blunt dissection was then carried down to fully connect an open up the 2 wounds. Small amount of drainage was encountered and this was fully drained. The 5th metatarsal head was then visualized. It was noted to be somewhat softened and dystrophic. At this time utilizing a sagittal saw and an osteotome the distal 5th metatarsal was resected. This was then passed from the operating field and sent for culture".    06/26/2024@0732 (Clarke):  He was lying in bed watching television.  He is having mild pain to left lower extremity.  He says the swelling and cramping are much improved.  He was waiting for Podiatry to let him know about surgery.  Patient tells me that he has had issues with his feet since he was a child.  He was seen specialist before and wore braces and it has not made much of a difference.  He had MRI that showed a possible abscess in left 5th metatarsal head osteomyelitis.  Eyes fevers or chills, nausea vomiting, states appetite was okay.  Voiding well, no diarrhea, no mouth blisters or ulcers.  T-max 98.1° in last 24 hours.  WBC 5.70, platelets 307, H&H 13.8/42.1, ESR 80, BUN/CR 15/0.9, ALT/AST 77/40, CRP 88.3.  MRSA screen negative.  Hemoglobin A1c 5.5.  " Cultures obtained from left foot wound are pending.  Blood cultures no growth to date. Arterial ultrasound was also negative for any obstructive disease. Dressing was removed, wound cleansed with wound cleanser and reapplied with Aquacel, 4x4s and Kerlix.    Antibiotics (From admission, onward)      Start     Stop Route Frequency Ordered    06/27/24 1030  ceFAZolin 2 g in D5W 50 mL IVPB (MB+)         -- IV Every 6 hours (non-standard times) 06/27/24 0925    06/25/24 2200  metroNIDAZOLE tablet 500 mg         -- Oral Every 8 hours 06/25/24 1914             Antifungals (From admission, onward)      None           Antivirals (From admission, onward)      None           ASSESSMENT and PLAN     Severe left foot SSTI with abscess and osteomyelitis 5th metatarsal head s/p I&D and excision of 5th metatarsal head 6/27   MRI with left 5th metatarsal head osteomyelitis and possible small abscess   Arterial ultrasound negative for significant obstructive disease   Hemoglobin A1c 5.5  6/25 Blood cultures x2 sets no growth to date, pending final   6/25 bedside cultures MSSA  6/27 OR cultures no growth to date, pending final   MRSA nasal swab negative   ESR 80, CRP 88.3      Daily alcohol intake    RECOMMENDATIONS:   Please refer to OPAT note  Stop Flagyl IV, no anaerobes isolated   Continue Ancef 2 g IV q.6, dose per BMI  Midline placed yesterday   Anticipating discharge home on continuous infusion  8g IV daily for at least 6 weeks through August 8th  Weekly labs CBC w/diff, CMP, CRP weekly while on antibiotics  Follow up ID clinic/Dr Bonilla in 3-4 weeks, office to set up appt  Follow up Podiatry outpatient   Wound care to all affected areas    ID will sign-off, call us back with any questions     D/w patient, nursing, case management, Dr Morrison/Hospitalist     Please send Epic secure chat with any questions.    Review of patient's allergies indicates:  No Known Allergies    SUBJECTIVE     Review of systems  Negative unless  stated above in Interval History     OBJECTIVE   Temp:  [97.8 °F (36.6 °C)-98.4 °F (36.9 °C)] 98 °F (36.7 °C)  Pulse:  [70-96] 70  Resp:  [10-20] 18  SpO2:  [95 %-98 %] 95 %  BP: (105-153)/(55-87) 151/87  Temp:  [97.8 °F (36.6 °C)-98.4 °F (36.9 °C)]   Temp: 98 °F (36.7 °C) (06/28/24 0743)  Pulse: 70 (06/28/24 0743)  Resp: 18 (06/28/24 0743)  BP: (!) 151/87 (06/28/24 0743)  SpO2: 95 % (06/28/24 0743)    Intake/Output Summary (Last 24 hours) at 6/28/2024 0839  Last data filed at 6/28/2024 0626  Gross per 24 hour   Intake 3231.92 ml   Output 2350 ml   Net 881.92 ml       Physical Exam  General:  Morbidly obese  male, lying in bed in no acute distress, breathing comfortable on room air  Eyes: Eyes with no icterus or injection. Vision grossly normal  Ears: Hearing grossly normal.  Nose: Nares patent  Mouth: Moist mucous membranes, dentition is good. No ulcerations, erythema or exudates.  Neck: Supple, no tenderness to palpation.  Cardiovascular: Regular rate and rhythm, no murmurs, no edema.    Respiratory:  Clear to auscultation bilaterally, no tachypnea or increased work of breathing.  Gastrointestinal:  Soft with active bowel sounds, no tenderness to palpation, no distention.  Genitourinary:  No suprapubic tenderness.  Musculoskeletal:  Moves all extremities with good strength.    Skin:  Warm and dry, ACE bandagein place, not disturbed - d/w podiatry, wound is closed   6/26: left foot wound on wrapped, small amount of drainage on dressings, slightly decreased erythema and swelling per patient assessment, tender to palpation - cleansed with wound cleanser and Aquacel, 4 x 4 and Kerlix applied  Neuro:   Oriented, conversant, follows commands.  Psych: Good mood, normal affect.   VAD:  R Midline   ISOLATION:  None    Wounds:   6/25:           Significant Labs: All pertinent labs within the past 24 hours have been reviewed.    CBC LAST 7  Recent Labs   Lab 06/25/24  0027 06/26/24  0445 06/27/24  0450  "06/28/24 0432   WBC 7.19 5.70 5.35 9.06   RBC 4.54* 4.38* 4.52* 4.38*   HGB 14.2 13.8* 14.0 13.9*   HCT 42.9 42.1 42.1 41.0   MCV 95 96 93 94   MCH 31.3* 31.5* 31.0 31.7*   MCHC 33.1 32.8 33.3 33.9   RDW 11.9 11.8 11.7 11.5    307 315 342   MPV 9.3 9.3 9.1* 9.1*   GRAN 51.8  3.7 50.9  2.9 52.0  2.8 75.7*  6.9   LYMPH 29.5  2.1 31.2  1.8 31.4  1.7 16.8*  1.5   MONO 12.8  0.9 11.6  0.7 10.3  0.6 6.4  0.6   BASO 0.07 0.10 0.08 0.03   NRBC 0 0 0 0       CHEMISTRY LAST 7  Recent Labs   Lab 06/25/24  0027 06/25/24 0524 06/26/24  0445 06/27/24 0456 06/28/24 0432     --  139 138 139   K 4.0  --  4.0 3.9 4.1     --  105 104 106   CO2 26  --  29 24 24   ANIONGAP 11  --  5* 10 9   BUN 16  --  15 12 12   CREATININE 0.9  --  0.9 0.9 0.8   *  --  132* 131* 144*   CALCIUM 9.6  --  9.0 9.2 8.9   MG  --  2.0  --   --   --    ALBUMIN 3.2*  --  2.8* 2.9* 2.8*   PROT 7.5  --  6.6 6.7 6.4   ALKPHOS 59  --  51* 55 53*   ALT 36  --  77* 59* 57*   AST 29  --  40 26 33   BILITOT 0.5  --  0.4 0.3 0.2       Estimated Creatinine Clearance: 133.5 mL/min (based on SCr of 0.8 mg/dL).    INFLAMMATORY/PROCAL  LAST 7  Recent Labs   Lab 06/25/24 0524   CRP 88.3*     No results found for: "ESR"  CRP   Date Value Ref Range Status   06/25/2024 88.3 (H) 0.0 - 8.2 mg/L Final   09/21/2021 0.80 (H) <0.76 mg/dL Final       PRIOR  MICROBIOLOGY:    Susceptibility data from last 90 days.  Collected Specimen Info Organism Ceftriaxone Clindamycin Erythromycin Oxacillin Penicillin Tetracycline Trimeth/Sulfa Vancomycin   06/25/24 Wound from Foot, Left Staphylococcus aureus           06/25/24 Wound from Foot, Left Staphylococcus aureus  S  S  S  S  R  S  S  S         LAST 7 DAYS MICROBIOLOGY   Microbiology Results (last 7 days)       Procedure Component Value Units Date/Time    Tissue culture [[id number]] Collected: 06/27/24 1128    Order Status: Completed Specimen: Bone Updated: 06/28/24 0657     Gram Stain Result Rare " WBC's      No organisms seen    Narrative:      2.) 5th metatarsal,  Left Foot.    Tissue culture [[id number]] Collected: 06/27/24 1128    Order Status: Completed Specimen: Bone Updated: 06/28/24 0656     Gram Stain Result No WBC's      No organisms seen    Narrative:      1.) Toe, Left Foot , Clean margin 5th metatarsal    Culture, Anaerobe [[id number]] Collected: 06/25/24 0903    Order Status: Completed Specimen: Wound from Foot, Left Updated: 06/27/24 1358     Anaerobic Culture No anaerobes isolated    Culture, Anaerobe [[id number]] Collected: 06/27/24 1128    Order Status: Sent Specimen: Bone from Toe, Left Foot Updated: 06/27/24 1343    Culture, Anaerobe [[id number]] Collected: 06/27/24 1128    Order Status: Sent Specimen: Bone from Toe, Left Foot Updated: 06/27/24 1343    Fungus culture [[id number]] Collected: 06/27/24 1128    Order Status: Sent Specimen: Bone from Toe, Left Foot Updated: 06/27/24 1343    Fungus culture [[id number]] Collected: 06/27/24 1128    Order Status: Sent Specimen: Bone from Toe, Left Foot Updated: 06/27/24 1343    Aerobic culture [[id number]] Collected: 06/27/24 1128    Order Status: Canceled Specimen: Bone from Toe, Left Foot     Aerobic culture [[id number]] Collected: 06/27/24 1128    Order Status: Canceled Specimen: Bone from Toe, Left Foot     Blood culture #2 **CANNOT BE ORDERED STAT** [[id number]] Collected: 06/25/24 0027    Order Status: Completed Specimen: Blood from Peripheral, Forearm, Left Updated: 06/27/24 1032     Blood Culture, Routine No Growth to date      No Growth to date      No Growth to date    Blood culture #1 **CANNOT BE ORDERED STAT** [[id number]] Collected: 06/25/24 0027    Order Status: Completed Specimen: Blood from Peripheral, Forearm, Right Updated: 06/27/24 1032     Blood Culture, Routine No Growth to date      No Growth to date      No Growth to date    Urine culture [[id number]] Collected: 06/25/24 0044    Order Status: Completed Specimen: Urine Updated:  06/27/24 0747     Urine Culture, Routine Multiple organisms isolated. None in predominance.  Repeat if      clinically necessary.    Narrative:      Specimen Source->Urine    Aerobic culture [[id number]]  (Abnormal) Collected: 06/25/24 1414    Order Status: Completed Specimen: Wound from Foot, Left Updated: 06/27/24 0658     Aerobic Bacterial Culture STAPHYLOCOCCUS AUREUS  Few  For susceptibility see order #[id number]      Aerobic culture [[id number]]  (Abnormal)  (Susceptibility) Collected: 06/25/24 0903    Order Status: Completed Specimen: Wound from Foot, Left Updated: 06/27/24 0657     Aerobic Bacterial Culture STAPHYLOCOCCUS AUREUS  Few      Culture, Anaerobe [[id number]] Collected: 06/25/24 1414    Order Status: Sent Specimen: Wound from Foot, Left Updated: 06/25/24 1916    MRSA Screen by PCR [[id number]] Collected: 06/25/24 0903    Order Status: Completed Specimen: Nasal Swab Updated: 06/25/24 1609     MRSA SCREEN BY PCR Negative    Aerobic culture (Specify Source) **CANNOT BE ORDERED AS STAT** [[id number]]     Order Status: Canceled Specimen: Wound from Foot, Left               CURRENT/PREVIOUS VISIT EKG  Results for orders placed or performed during the hospital encounter of 06/24/24   EKG 12-lead    Collection Time: 06/25/24  1:49 AM   Result Value Ref Range    QRS Duration 106 ms    OHS QTC Calculation 443 ms    Narrative    Test Reason : I10,    Vent. Rate : 071 BPM     Atrial Rate : 071 BPM     P-R Int : 178 ms          QRS Dur : 106 ms      QT Int : 408 ms       P-R-T Axes : 024 -01 036 degrees     QTc Int : 443 ms    Normal sinus rhythm  Incomplete right bundle branch block  Borderline Abnormal ECG  No previous ECGs available  Confirmed by Giuseppe Hooper MD ([id number]) on 6/26/2024 12:09:11 PM    Referred By: [email or web address]   SELF           Confirmed By:Giuseppe Hooper MD            Significant Imaging: I have reviewed all relevant and available imaging results/findings within the past 24 hours.    X-ray L  foot: Soft tissue swelling and appearance suggesting ulcer in the soft tissues lateral to the 5th metatarsophalangeal joint.  No definitive findings of osteomyelitis but suggest further evaluation with MRI if indicated clinically.     US Lower Extremity Arteries Bilateral 06/25/24 1333   Multiphasic waveforms are present bilaterally from the common femoral through the dorsalis pedis arteries.  Peak systolic velocities are well maintained.  No focal stenosis is identified.   Impression:    No evidence for significant lower extremity arterial obstructive disease.    MRI Foot (Forefoot) Left Without Contrast 06/25/24 1545  There is a soft tissue wound with subjacent soft tissue swelling of the lateral forefoot at the level of the 5th metatarsophalangeal joint.  The 5th metatarsal head demonstrates cortical erosion and T1 hypointense, T2 hyperintense marrow signal abnormality, in keeping with osteomyelitis.  There is mild T2 hyperintense marrow signal mallet Ee within the base and shaft of the 5th proximal phalanx, without corresponding T1 signal abnormality, most compatible with reactive marrow edema.  There is a fluid collection within the subcutaneous soft tissues lateral to the 5th metatarsal joint, measuring 1.4 cm there is abundant T2 hyperintense signal within the subcutaneous soft tissues of the dorsum of the foot, which may reflect edema or cellulitis.   Impression:    Soft tissue wound of the lateral forefoot, accompanied by osteomyelitis of the head of the 5th metatarsal, as well as a small soft tissue fluid collection suspicious for abscess.       I spent a total of 50 minutes on the day of the visit.This includes face to face time and non-face to face time preparing to see the patient (eg, review of tests), obtaining and/or reviewing separately obtained history, documenting clinical information in the electronic or other health record, independently interpreting results and communicating results to the  patient/family/caregiver, or care coordinator.    Carolynn Armenta MD  Date of Service: 06/28/2024      This note was created using [employer] voice recognition software that occasionally misinterpreted phrases or words.

## 2024-06-28 NOTE — PLAN OF CARE
Met with pt at bedside to discuss dc planning. Pt understands he needs home IV abx but he does not have insurance. medicaid is pending. I sent a packet to [employer] [facility] to see if they can assist. David (rep) is checking self pay prices.     In basket sent to ID clinic for scheduling       06/28/24 1122   Discharge Reassessment   Assessment Type Discharge Planning Reassessment   Did the patient's condition or plan change since previous assessment? Yes   Discharge Plan discussed with: Patient   Communicated BECKY with patient/caregiver Yes   Discharge Plan A Home with family   Discharge Plan B Home   DME Needed Upon Discharge  none   Transition of Care Barriers Unisured   Why the patient remains in the hospital Requires continued medical care;Insurance issues   Post-Acute Status   Post-Acute Authorization IV Infusion   IV Infusion Status Referral(s) sent   Discharge Delays (!) Medication Delay (Cost, Insurance)  (uninsured)

## 2024-06-28 NOTE — HOSPITAL COURSE
Patient presented with left foot wound was found to have osteo and abscess.  Underwent I&D.  Currently getting IV antibiotics.  Patient is growing MSSA.  Following ID recommendations.  Case management worked with patient to obtain Medicaid to pay for long term IV abx. This was approved and the patient was discharged home in stable condition.

## 2024-06-28 NOTE — SUBJECTIVE & OBJECTIVE
Interval History:  No acute events overnight.  Patient states he is okay after surgery.  Cultures growing MSSA.  Patient will need least 6 weeks of IV antibiotics.  Patient is uninsured, so working with case management for plan    Review of Systems  Objective:     Vital Signs (Most Recent):  Temp: 98 °F (36.7 °C) (06/28/24 0743)  Pulse: 70 (06/28/24 0743)  Resp: 18 (06/28/24 0743)  BP: (!) 151/87 (06/28/24 0743)  SpO2: 95 % (06/28/24 0743) Vital Signs (24h Range):  Temp:  [97.8 °F (36.6 °C)-98.4 °F (36.9 °C)] 98 °F (36.7 °C)  Pulse:  [70-96] 70  Resp:  [10-20] 18  SpO2:  [95 %-98 %] 95 %  BP: (105-153)/(55-87) 151/87     Weight: 115.8 kg (255 lb 4.7 oz)  Body mass index is 38.82 kg/m².    Intake/Output Summary (Last 24 hours) at 6/28/2024 1050  Last data filed at 6/28/2024 0844  Gross per 24 hour   Intake 3471.92 ml   Output 2350 ml   Net 1121.92 ml         Physical Exam  Constitutional:       Appearance: Normal appearance.   HENT:      Head: Normocephalic and atraumatic.      Right Ear: External ear normal.      Left Ear: External ear normal.      Nose: Nose normal.      Mouth/Throat:      Mouth: Mucous membranes are moist.      Pharynx: Oropharynx is clear.   Eyes:      Extraocular Movements: Extraocular movements intact.      Conjunctiva/sclera: Conjunctivae normal.   Cardiovascular:      Rate and Rhythm: Normal rate and regular rhythm.      Pulses: Normal pulses.      Heart sounds: Normal heart sounds. No murmur heard.     No gallop.   Pulmonary:      Effort: Pulmonary effort is normal. No respiratory distress.      Breath sounds: Normal breath sounds. No wheezing, rhonchi or rales.   Abdominal:      General: Abdomen is flat. There is no distension.      Palpations: Abdomen is soft.      Tenderness: There is no abdominal tenderness.   Musculoskeletal:         General: No swelling. Normal range of motion.      Cervical back: Normal range of motion and neck supple.      Comments: Foot bandaged   Skin:     General:  Skin is warm and dry.   Neurological:      General: No focal deficit present.      Mental Status: He is alert and oriented to person, place, and time.             Significant Labs: All pertinent labs within the past 24 hours have been reviewed.    Significant Imaging: I have reviewed all pertinent imaging results/findings within the past 24 hours.

## 2024-06-28 NOTE — PLAN OF CARE
[facility]   Department of Infectious Disease    PATIENT NAME: Salvador Bermudez  MRN: [id number]  TODAY'S DATE: 06/28/2024  ADMIT DATE: 6/24/2024  LENGTH OF STAY: 3 DAYS  PROJECTED DISCHARGE DATE: 7/1/24      OUTPATIENT PARENTERAL ANTIBIOTIC THERAPY PLAN:       Case Management: Please send referral to Home Health and/or Home Infusion Agencies     1) Diagnosis:  Left foot abscess MSSA      2) Discharge Antibiotics:  IV antibiotics:   1. Cefazolin 8g IV continuous infusion daily for 6 weeks through Aug 8th    3) Therapy Duration:    Estimated end date of IV antibiotics: August 8th, 2024    4) Intravenous Access:  Midline placed on: 6/27/24  Routine midline care for duration of antibiotic therapy with weekly dressing changes per protocol  Midline needs to be exchanged on July 25th  Please remove Midline at the end of therapy Aug 8th    5) Outpatient Weekly Labs for duration of antibiotic therapy:  Order the following labs to be drawn on Mondays:  CBC with Diff, CMP, and CRP    6) Fax Lab Results to Infectious Diseases Provider if not done at [facility] facility:   [facility] Fax Number: [phone], Attn: Dr Carolynn Bonilla      7) Outpatient Infectious Diseases Follow-up: in 3-4 weeks   Follow-up appointment will be arranged by the ID clinic and will be found in the patient's appointments tab.  Prior to discharge, please ensure the patient's follow-up has been scheduled.    If there is still no follow-up scheduled prior to discharge, please send an EPIC message to Breanne Collins in [facility].      Carolynn Armenta MD 06/28/2024  2:08 PM  [facility]    This note was created using [employer]  voice recognition software that occasionally misinterpreted phrases or words.

## 2024-06-28 NOTE — CARE UPDATE
06/27/24 2014   Patient Assessment/Suction   Level of Consciousness (AVPU) alert   Respiratory Effort Unlabored   Expansion/Accessory Muscles/Retractions expansion symmetric;no use of accessory muscles   All Lung Fields Breath Sounds Anterior:;Lateral:;equal bilaterally   Rhythm/Pattern, Respiratory unlabored;pattern regular   Cough Frequency no cough   PRE-TX-O2   Device (Oxygen Therapy) room air   SpO2 98 %   Pulse Oximetry Type Intermittent   $ Pulse Oximetry - Multiple Charge Pulse Oximetry - Multiple   Pulse 96

## 2024-06-29 LAB
ALBUMIN SERPL BCP-MCNC: 2.8 G/DL (ref 3.5–5.2)
ALP SERPL-CCNC: 50 U/L (ref 55–135)
ALT SERPL W/O P-5'-P-CCNC: 77 U/L (ref 10–44)
ANION GAP SERPL CALC-SCNC: 10 MMOL/L (ref 8–16)
AST SERPL-CCNC: 72 U/L (ref 10–40)
BACTERIA SPEC ANAEROBE CULT: NORMAL
BACTERIA SPEC ANAEROBE CULT: NORMAL
BILIRUB SERPL-MCNC: 0.2 MG/DL (ref 0.1–1)
BUN SERPL-MCNC: 12 MG/DL (ref 6–20)
CALCIUM SERPL-MCNC: 8.6 MG/DL (ref 8.7–10.5)
CHLORIDE SERPL-SCNC: 104 MMOL/L (ref 95–110)
CO2 SERPL-SCNC: 27 MMOL/L (ref 23–29)
CREAT SERPL-MCNC: 0.9 MG/DL (ref 0.5–1.4)
EST. GFR  (NO RACE VARIABLE): >60 ML/MIN/1.73 M^2
GLUCOSE SERPL-MCNC: 105 MG/DL (ref 70–110)
POTASSIUM SERPL-SCNC: 3.7 MMOL/L (ref 3.5–5.1)
PROT SERPL-MCNC: 6.2 G/DL (ref 6–8.4)
SODIUM SERPL-SCNC: 141 MMOL/L (ref 136–145)

## 2024-06-29 PROCEDURE — 25000003 PHARM REV CODE 250

## 2024-06-29 PROCEDURE — 25000003 PHARM REV CODE 250: Performed by: STUDENT IN AN ORGANIZED HEALTH CARE EDUCATION/TRAINING PROGRAM

## 2024-06-29 PROCEDURE — 99900031 HC PATIENT EDUCATION (STAT)

## 2024-06-29 PROCEDURE — 11000001 HC ACUTE MED/SURG PRIVATE ROOM

## 2024-06-29 PROCEDURE — 63600175 PHARM REV CODE 636 W HCPCS: Performed by: STUDENT IN AN ORGANIZED HEALTH CARE EDUCATION/TRAINING PROGRAM

## 2024-06-29 PROCEDURE — 94799 UNLISTED PULMONARY SVC/PX: CPT | Mod: XB

## 2024-06-29 PROCEDURE — 80053 COMPREHEN METABOLIC PANEL: CPT

## 2024-06-29 PROCEDURE — 94761 N-INVAS EAR/PLS OXIMETRY MLT: CPT

## 2024-06-29 PROCEDURE — 36415 COLL VENOUS BLD VENIPUNCTURE: CPT

## 2024-06-29 RX ORDER — HYDRALAZINE HYDROCHLORIDE 20 MG/ML
10 INJECTION INTRAMUSCULAR; INTRAVENOUS EVERY 6 HOURS PRN
Status: DISCONTINUED | OUTPATIENT
Start: 2024-06-29 | End: 2024-07-02 | Stop reason: HOSPADM

## 2024-06-29 RX ADMIN — SODIUM CHLORIDE: 9 INJECTION, SOLUTION INTRAVENOUS at 02:06

## 2024-06-29 RX ADMIN — CEFAZOLIN 2 G: 2 INJECTION, POWDER, FOR SOLUTION INTRAMUSCULAR; INTRAVENOUS at 03:06

## 2024-06-29 RX ADMIN — CEFAZOLIN 2 G: 2 INJECTION, POWDER, FOR SOLUTION INTRAMUSCULAR; INTRAVENOUS at 04:06

## 2024-06-29 RX ADMIN — SODIUM CHLORIDE: 9 INJECTION, SOLUTION INTRAVENOUS at 01:06

## 2024-06-29 RX ADMIN — CEFAZOLIN 2 G: 2 INJECTION, POWDER, FOR SOLUTION INTRAMUSCULAR; INTRAVENOUS at 10:06

## 2024-06-29 RX ADMIN — CEFAZOLIN 2 G: 2 INJECTION, POWDER, FOR SOLUTION INTRAMUSCULAR; INTRAVENOUS at 09:06

## 2024-06-29 NOTE — PLAN OF CARE
Problem: Adult Inpatient Plan of Care  Goal: Plan of Care Review  Outcome: Progressing  Goal: Patient-Specific Goal (Individualized)  Outcome: Progressing  Goal: Absence of Hospital-Acquired Illness or Injury  Outcome: Progressing  Goal: Optimal Comfort and Wellbeing  Outcome: Progressing  Goal: Readiness for Transition of Care  Outcome: Progressing     Problem: Infection  Goal: Absence of Infection Signs and Symptoms  Outcome: Progressing     Problem: Wound  Goal: Optimal Coping  Outcome: Progressing  Goal: Optimal Functional Ability  Outcome: Progressing  Goal: Absence of Infection Signs and Symptoms  Outcome: Progressing  Goal: Improved Oral Intake  Outcome: Progressing  Goal: Optimal Pain Control and Function  Outcome: Progressing  Goal: Skin Health and Integrity  Outcome: Progressing  Goal: Optimal Wound Healing  Outcome: Progressing     Problem: Pain Acute  Goal: Optimal Pain Control and Function  Outcome: Progressing     Problem: Fall Injury Risk  Goal: Absence of Fall and Fall-Related Injury  Outcome: Progressing     Problem: Skin or Soft Tissue Infection  Goal: Absence of Infection Signs and Symptoms  Outcome: Progressing     Problem: Mobility Impairment  Goal: Optimal Mobility  Outcome: Progressing   Plan of care reviewed with patient. Patient verbalized complete understanding. Two hour patient rounding maintained throughout shift. All fall precautions maintained. Bed in lowest position, locked, call light within reach. Side rails up x 2. Slip resistant socks maintained. Needs attended to.    .

## 2024-06-29 NOTE — PLAN OF CARE
Problem: Adult Inpatient Plan of Care  Goal: Plan of Care Review  Outcome: Progressing  Goal: Patient-Specific Goal (Individualized)  Outcome: Progressing  Goal: Absence of Hospital-Acquired Illness or Injury  Outcome: Progressing  Goal: Optimal Comfort and Wellbeing  Outcome: Progressing  Goal: Readiness for Transition of Care  Outcome: Progressing     Problem: Wound  Goal: Optimal Coping  Outcome: Progressing  Goal: Optimal Functional Ability  Outcome: Progressing  Goal: Absence of Infection Signs and Symptoms  Outcome: Progressing  Goal: Improved Oral Intake  Outcome: Progressing  Goal: Optimal Pain Control and Function  Outcome: Progressing  Goal: Skin Health and Integrity  Outcome: Progressing  Goal: Optimal Wound Healing  Outcome: Progressing     Problem: Pain Acute  Goal: Optimal Pain Control and Function  Outcome: Progressing     Problem: Fall Injury Risk  Goal: Absence of Fall and Fall-Related Injury  Outcome: Progressing     Problem: Skin or Soft Tissue Infection  Goal: Absence of Infection Signs and Symptoms  Outcome: Progressing     Problem: Mobility Impairment  Goal: Optimal Mobility  Outcome: Progressing

## 2024-06-29 NOTE — ASSESSMENT & PLAN NOTE
-Podiatry Consult, appreciate recommendations; I and D on 06/27/2024  -wound care following  -MRI concerning for abscess and osteo  -cultures positive for MSSA  -continue IV antibiotics per ID recommendations  -analgesic prn  -reviewed labs  Working with case management to come up with plan for IV antibiotics

## 2024-06-29 NOTE — PROGRESS NOTES
[facility] Medicine  Progress Note    Patient Name: Salvador Bermudez  MRN: [id number]  Patient Class: IP- Inpatient   Admission Date: 6/24/2024  Length of Stay: 4 days  Attending Physician: Venessa Valdes MD  Primary Care Provider: Lyubov, Primary Doctor        Subjective:     Principal Problem:Cellulitis of left foot        HPI:  Salvador Bermudez is a 54 year old male with a past medical history of obesity who presents with complaints of left foot wound which started 3 months ago however worsening pain, swelling, and redness over the past 3 days. He reports 3 months ago he had a blister to the left foot after wearing a pair of new shoes while on a cruise. He began treatment with wound care however his left foot is more swollen.  ED workup:  Left foot x-ray concerning for possible bony involvement at the base of the left metatarsal.  CBC and BMP unremarkable. Lactic acid 1.2 and  ESR  pending. Started on vancomycin in ED. Left foot MRI pending. Podiatry consult placed.  Admitted to hospital medicine for further management and treatment.                        Overview/Hospital Course:  Patient presented with left foot wound was found to have osteo and abscess.  Underwent I&D.  Currently getting IV antibiotics.  Patient is growing MSSA.  Following ID recommendations.  Working with case management to come up with plan for outpatient antibiotics.  Patient currently uninsured.    Interval History: Patient states pain is controlled.    Review of Systems   Constitutional:  Negative for activity change, appetite change, chills and fever.   HENT:  Negative for congestion, ear pain, nosebleeds and sinus pain.    Eyes:  Negative for discharge and itching.   Respiratory:  Negative for apnea, cough, chest tightness and shortness of breath.    Cardiovascular:  Negative for chest pain, palpitations and leg swelling.   Gastrointestinal:  Negative for abdominal distention, abdominal pain and vomiting.    Genitourinary:  Negative for difficulty urinating, dysuria, flank pain and frequency.   Musculoskeletal:  Positive for arthralgias. Negative for back pain, joint swelling and myalgias.   Skin:  Negative for color change, pallor and rash.   Neurological:  Negative for dizziness, weakness, light-headedness and headaches.   Psychiatric/Behavioral:  Negative for agitation, behavioral problems, confusion and suicidal ideas.      Objective:     Vital Signs (Most Recent):  Temp: 98.4 °F (36.9 °C) (06/29/24 1222)  Pulse: 78 (06/29/24 1222)  Resp: 18 (06/29/24 1222)  BP: (!) 158/92 (06/29/24 1222)  SpO2: 97 % (06/29/24 1222) Vital Signs (24h Range):  Temp:  [97.5 °F (36.4 °C)-98.9 °F (37.2 °C)] 98.4 °F (36.9 °C)  Pulse:  [70-78] 78  Resp:  [16-18] 18  SpO2:  [94 %-97 %] 97 %  BP: (129-161)/(85-92) 158/92     Weight: 117 kg (257 lb 15 oz)  Body mass index is 39.22 kg/m².    Intake/Output Summary (Last 24 hours) at 6/29/2024 1427  Last data filed at 6/29/2024 1035  Gross per 24 hour   Intake 4096.53 ml   Output 3425 ml   Net 671.53 ml         Physical Exam  Vitals reviewed.   Constitutional:       General: He is not in acute distress.     Appearance: Normal appearance. He is normal weight. He is not ill-appearing.   HENT:      Head: Normocephalic and atraumatic.      Nose: Nose normal.      Mouth/Throat:      Mouth: Mucous membranes are moist.      Pharynx: Oropharynx is clear.   Eyes:      General: No scleral icterus.     Extraocular Movements: Extraocular movements intact.      Conjunctiva/sclera: Conjunctivae normal.      Pupils: Pupils are equal, round, and reactive to light.   Cardiovascular:      Rate and Rhythm: Normal rate and regular rhythm.      Pulses: Normal pulses.      Heart sounds: Normal heart sounds. No murmur heard.     No gallop.   Pulmonary:      Effort: Pulmonary effort is normal. No respiratory distress.      Breath sounds: Normal breath sounds. No wheezing, rhonchi or rales.   Chest:      Chest wall:  No tenderness.   Abdominal:      General: Abdomen is flat. There is no distension.      Palpations: Abdomen is soft.      Tenderness: There is no abdominal tenderness.   Musculoskeletal:         General: No swelling or tenderness.      Cervical back: Normal range of motion and neck supple. No rigidity or tenderness.      Right lower leg: No edema.      Left lower leg: No edema.      Comments: Left foot dressing clean and dry.   Skin:     General: Skin is warm and dry.   Neurological:      General: No focal deficit present.      Mental Status: He is alert and oriented to person, place, and time. Mental status is at baseline.      Motor: No weakness.   Psychiatric:         Mood and Affect: Mood normal.         Behavior: Behavior normal.         Thought Content: Thought content normal.             Significant Labs: All pertinent labs within the past 24 hours have been reviewed.  BMP:   Recent Labs   Lab 06/29/24  0451         K 3.7      CO2 27   BUN 12   CREATININE 0.9   CALCIUM 8.6*     CBC:   Recent Labs   Lab 06/28/24 0432   WBC 9.06   HGB 13.9*   HCT 41.0        CMP:   Recent Labs   Lab 06/28/24  0432 06/29/24  0451    141   K 4.1 3.7    104   CO2 24 27   * 105   BUN 12 12   CREATININE 0.8 0.9   CALCIUM 8.9 8.6*   PROT 6.4 6.2   ALBUMIN 2.8* 2.8*   BILITOT 0.2 0.2   ALKPHOS 53* 50*   AST 33 72*   ALT 57* 77*   ANIONGAP 9 10       Significant Imaging: I have reviewed all pertinent imaging results/findings within the past 24 hours.    Assessment/Plan:      * Cellulitis of left foot  -Podiatry Consult, appreciate recommendations; I and D on 06/27/2024  -wound care following  -MRI concerning for abscess and osteo  -cultures positive for MSSA  -continue IV antibiotics per ID recommendations  -analgesic prn  -reviewed labs  Working with case management to come up with plan for IV antibiotics      Obesity (BMI 35.0-39.9 without comorbidity)  Body mass index is 39.22 kg/m².  Morbid obesity complicates all aspects of disease management from diagnostic modalities to treatment. Weight loss encouraged and health benefits explained to patient.           VTE Risk Mitigation (From admission, onward)           Ordered     IP VTE HIGH RISK PATIENT  Once         06/27/24 1125     Place sequential compression device  Until discontinued         06/27/24 1125                    Discharge Planning   BECKY:      Code Status: Full Code   Is the patient medically ready for discharge?:     Reason for patient still in hospital (select all that apply): Pending disposition  Discharge Plan A: Home with family   Discharge Delays: (!) Medication Delay (Cost, Insurance) (uninsured)              Venessa Valdes MD, MD  Department of Hospital Medicine   [facility]/Surg

## 2024-06-29 NOTE — CARE UPDATE
06/29/24 0758   Patient Assessment/Suction   Level of Consciousness (AVPU) alert   Respiratory Effort Unlabored   Expansion/Accessory Muscles/Retractions no use of accessory muscles;no retractions;expansion symmetric   Rhythm/Pattern, Respiratory unlabored;pattern regular;depth regular;no shortness of breath reported   Cough Frequency no cough   PRE-TX-O2   Device (Oxygen Therapy) room air   SpO2 96 %   Pulse Oximetry Type Intermittent   $ Pulse Oximetry - Multiple Charge Pulse Oximetry - Multiple   Pulse 70   Resp 18   Positioning HOB elevated 45 degrees   Incentive Spirometer   $ Incentive Spirometer Charges done with encouragement   Incentive Spirometer Predicted Level (mL) 2120   Administration (IS) mouthpiece utilized   Number of Repetitions (IS) 8   Level Incentive Spirometer (mL) 2000   Patient Tolerance (IS) good;no adverse signs/symptoms present   Education   $ Education 15 min

## 2024-06-29 NOTE — SUBJECTIVE & OBJECTIVE
Interval History: Patient states pain is controlled.    Review of Systems   Constitutional:  Negative for activity change, appetite change, chills and fever.   HENT:  Negative for congestion, ear pain, nosebleeds and sinus pain.    Eyes:  Negative for discharge and itching.   Respiratory:  Negative for apnea, cough, chest tightness and shortness of breath.    Cardiovascular:  Negative for chest pain, palpitations and leg swelling.   Gastrointestinal:  Negative for abdominal distention, abdominal pain and vomiting.   Genitourinary:  Negative for difficulty urinating, dysuria, flank pain and frequency.   Musculoskeletal:  Positive for arthralgias. Negative for back pain, joint swelling and myalgias.   Skin:  Negative for color change, pallor and rash.   Neurological:  Negative for dizziness, weakness, light-headedness and headaches.   Psychiatric/Behavioral:  Negative for agitation, behavioral problems, confusion and suicidal ideas.      Objective:     Vital Signs (Most Recent):  Temp: 98.4 °F (36.9 °C) (06/29/24 1222)  Pulse: 78 (06/29/24 1222)  Resp: 18 (06/29/24 1222)  BP: (!) 158/92 (06/29/24 1222)  SpO2: 97 % (06/29/24 1222) Vital Signs (24h Range):  Temp:  [97.5 °F (36.4 °C)-98.9 °F (37.2 °C)] 98.4 °F (36.9 °C)  Pulse:  [70-78] 78  Resp:  [16-18] 18  SpO2:  [94 %-97 %] 97 %  BP: (129-161)/(85-92) 158/92     Weight: 117 kg (257 lb 15 oz)  Body mass index is 39.22 kg/m².    Intake/Output Summary (Last 24 hours) at 6/29/2024 1427  Last data filed at 6/29/2024 1035  Gross per 24 hour   Intake 4096.53 ml   Output 3425 ml   Net 671.53 ml         Physical Exam  Vitals reviewed.   Constitutional:       General: He is not in acute distress.     Appearance: Normal appearance. He is normal weight. He is not ill-appearing.   HENT:      Head: Normocephalic and atraumatic.      Nose: Nose normal.      Mouth/Throat:      Mouth: Mucous membranes are moist.      Pharynx: Oropharynx is clear.   Eyes:      General: No scleral  icterus.     Extraocular Movements: Extraocular movements intact.      Conjunctiva/sclera: Conjunctivae normal.      Pupils: Pupils are equal, round, and reactive to light.   Cardiovascular:      Rate and Rhythm: Normal rate and regular rhythm.      Pulses: Normal pulses.      Heart sounds: Normal heart sounds. No murmur heard.     No gallop.   Pulmonary:      Effort: Pulmonary effort is normal. No respiratory distress.      Breath sounds: Normal breath sounds. No wheezing, rhonchi or rales.   Chest:      Chest wall: No tenderness.   Abdominal:      General: Abdomen is flat. There is no distension.      Palpations: Abdomen is soft.      Tenderness: There is no abdominal tenderness.   Musculoskeletal:         General: No swelling or tenderness.      Cervical back: Normal range of motion and neck supple. No rigidity or tenderness.      Right lower leg: No edema.      Left lower leg: No edema.      Comments: Left foot dressing clean and dry.   Skin:     General: Skin is warm and dry.   Neurological:      General: No focal deficit present.      Mental Status: He is alert and oriented to person, place, and time. Mental status is at baseline.      Motor: No weakness.   Psychiatric:         Mood and Affect: Mood normal.         Behavior: Behavior normal.         Thought Content: Thought content normal.             Significant Labs: All pertinent labs within the past 24 hours have been reviewed.  BMP:   Recent Labs   Lab 06/29/24  0451         K 3.7      CO2 27   BUN 12   CREATININE 0.9   CALCIUM 8.6*     CBC:   Recent Labs   Lab 06/28/24  0432   WBC 9.06   HGB 13.9*   HCT 41.0        CMP:   Recent Labs   Lab 06/28/24  0432 06/29/24  0451    141   K 4.1 3.7    104   CO2 24 27   * 105   BUN 12 12   CREATININE 0.8 0.9   CALCIUM 8.9 8.6*   PROT 6.4 6.2   ALBUMIN 2.8* 2.8*   BILITOT 0.2 0.2   ALKPHOS 53* 50*   AST 33 72*   ALT 57* 77*   ANIONGAP 9 10       Significant Imaging: I have  reviewed all pertinent imaging results/findings within the past 24 hours.

## 2024-06-30 LAB
ALBUMIN SERPL BCP-MCNC: 2.9 G/DL (ref 3.5–5.2)
ALP SERPL-CCNC: 47 U/L (ref 55–135)
ALT SERPL W/O P-5'-P-CCNC: 75 U/L (ref 10–44)
ANION GAP SERPL CALC-SCNC: 8 MMOL/L (ref 8–16)
AST SERPL-CCNC: 64 U/L (ref 10–40)
BACTERIA BLD CULT: NORMAL
BACTERIA BLD CULT: NORMAL
BILIRUB SERPL-MCNC: 0.2 MG/DL (ref 0.1–1)
BUN SERPL-MCNC: 10 MG/DL (ref 6–20)
CALCIUM SERPL-MCNC: 8.9 MG/DL (ref 8.7–10.5)
CHLORIDE SERPL-SCNC: 104 MMOL/L (ref 95–110)
CO2 SERPL-SCNC: 27 MMOL/L (ref 23–29)
CREAT SERPL-MCNC: 0.9 MG/DL (ref 0.5–1.4)
EST. GFR  (NO RACE VARIABLE): >60 ML/MIN/1.73 M^2
GLUCOSE SERPL-MCNC: 114 MG/DL (ref 70–110)
POTASSIUM SERPL-SCNC: 3.9 MMOL/L (ref 3.5–5.1)
PROT SERPL-MCNC: 6.3 G/DL (ref 6–8.4)
SODIUM SERPL-SCNC: 139 MMOL/L (ref 136–145)

## 2024-06-30 PROCEDURE — 36415 COLL VENOUS BLD VENIPUNCTURE: CPT

## 2024-06-30 PROCEDURE — 25000003 PHARM REV CODE 250: Performed by: STUDENT IN AN ORGANIZED HEALTH CARE EDUCATION/TRAINING PROGRAM

## 2024-06-30 PROCEDURE — 94799 UNLISTED PULMONARY SVC/PX: CPT

## 2024-06-30 PROCEDURE — 11000001 HC ACUTE MED/SURG PRIVATE ROOM

## 2024-06-30 PROCEDURE — 25000003 PHARM REV CODE 250

## 2024-06-30 PROCEDURE — 63600175 PHARM REV CODE 636 W HCPCS: Performed by: INTERNAL MEDICINE

## 2024-06-30 PROCEDURE — 94761 N-INVAS EAR/PLS OXIMETRY MLT: CPT

## 2024-06-30 PROCEDURE — 63600175 PHARM REV CODE 636 W HCPCS: Performed by: STUDENT IN AN ORGANIZED HEALTH CARE EDUCATION/TRAINING PROGRAM

## 2024-06-30 PROCEDURE — 80053 COMPREHEN METABOLIC PANEL: CPT

## 2024-06-30 RX ORDER — HYDRALAZINE HYDROCHLORIDE 20 MG/ML
10 INJECTION INTRAMUSCULAR; INTRAVENOUS ONCE
Status: COMPLETED | OUTPATIENT
Start: 2024-06-30 | End: 2024-06-30

## 2024-06-30 RX ADMIN — CEFAZOLIN 2 G: 2 INJECTION, POWDER, FOR SOLUTION INTRAMUSCULAR; INTRAVENOUS at 10:06

## 2024-06-30 RX ADMIN — HYDRALAZINE HYDROCHLORIDE 10 MG: 20 INJECTION INTRAMUSCULAR; INTRAVENOUS at 12:06

## 2024-06-30 RX ADMIN — SODIUM CHLORIDE: 9 INJECTION, SOLUTION INTRAVENOUS at 12:06

## 2024-06-30 RX ADMIN — CEFAZOLIN 2 G: 2 INJECTION, POWDER, FOR SOLUTION INTRAMUSCULAR; INTRAVENOUS at 03:06

## 2024-06-30 RX ADMIN — CEFAZOLIN 2 G: 2 INJECTION, POWDER, FOR SOLUTION INTRAMUSCULAR; INTRAVENOUS at 05:06

## 2024-06-30 NOTE — PROGRESS NOTES
[facility] Medicine  Progress Note    Patient Name: Salvador Bermudez  MRN: [id number]  Patient Class: IP- Inpatient   Admission Date: 6/24/2024  Length of Stay: 5 days  Attending Physician: Venessa Valdes MD  Primary Care Provider: Lyubov, Primary Doctor        Subjective:     Principal Problem:Cellulitis of left foot        HPI:  Salvador Bermudez is a 54 year old male with a past medical history of obesity who presents with complaints of left foot wound which started 3 months ago however worsening pain, swelling, and redness over the past 3 days. He reports 3 months ago he had a blister to the left foot after wearing a pair of new shoes while on a cruise. He began treatment with wound care however his left foot is more swollen.  ED workup:  Left foot x-ray concerning for possible bony involvement at the base of the left metatarsal.  CBC and BMP unremarkable. Lactic acid 1.2 and  ESR  pending. Started on vancomycin in ED. Left foot MRI pending. Podiatry consult placed.  Admitted to hospital medicine for further management and treatment.                        Overview/Hospital Course:  Patient presented with left foot wound was found to have osteo and abscess.  Underwent I&D.  Currently getting IV antibiotics.  Patient is growing MSSA.  Following ID recommendations.  Working with case management to come up with plan for outpatient antibiotics.  Patient currently uninsured.    Interval History: Patient states pain is controlled. No new complaints.    Review of Systems   Constitutional:  Negative for activity change, appetite change, chills and fever.   HENT:  Negative for congestion, ear pain, nosebleeds and sinus pain.    Eyes:  Negative for discharge and itching.   Respiratory:  Negative for apnea, cough, chest tightness and shortness of breath.    Cardiovascular:  Negative for chest pain, palpitations and leg swelling.   Gastrointestinal:  Negative for abdominal distention, abdominal pain  and vomiting.   Genitourinary:  Negative for difficulty urinating, dysuria, flank pain and frequency.   Musculoskeletal:  Positive for arthralgias. Negative for back pain, joint swelling and myalgias.   Skin:  Negative for color change, pallor and rash.   Neurological:  Negative for dizziness, weakness, light-headedness and headaches.   Psychiatric/Behavioral:  Negative for agitation, behavioral problems, confusion and suicidal ideas.      Objective:     Vital Signs (Most Recent):  Temp: 98 °F (36.7 °C) (06/30/24 0809)  Pulse: 72 (06/30/24 0809)  Resp: 18 (06/30/24 0809)  BP: (!) 160/112 (06/30/24 0815)  SpO2: 95 % (06/30/24 0809) Vital Signs (24h Range):  Temp:  [98 °F (36.7 °C)-98.6 °F (37 °C)] 98 °F (36.7 °C)  Pulse:  [60-83] 72  Resp:  [18-20] 18  SpO2:  [95 %-97 %] 95 %  BP: (127-180)/() 160/112     Weight: 112.7 kg (248 lb 7.3 oz)  Body mass index is 37.78 kg/m².    Intake/Output Summary (Last 24 hours) at 6/30/2024 1132  Last data filed at 6/30/2024 0623  Gross per 24 hour   Intake 2416.52 ml   Output 2950 ml   Net -533.48 ml         Physical Exam  Vitals reviewed.   Constitutional:       General: He is not in acute distress.     Appearance: Normal appearance. He is normal weight. He is not ill-appearing.   HENT:      Head: Normocephalic and atraumatic.      Nose: Nose normal.      Mouth/Throat:      Mouth: Mucous membranes are moist.      Pharynx: Oropharynx is clear.   Eyes:      General: No scleral icterus.     Extraocular Movements: Extraocular movements intact.      Conjunctiva/sclera: Conjunctivae normal.      Pupils: Pupils are equal, round, and reactive to light.   Cardiovascular:      Rate and Rhythm: Normal rate and regular rhythm.      Pulses: Normal pulses.      Heart sounds: Normal heart sounds. No murmur heard.     No gallop.   Pulmonary:      Effort: Pulmonary effort is normal. No respiratory distress.      Breath sounds: Normal breath sounds. No wheezing, rhonchi or rales.   Chest:       "Chest wall: No tenderness.   Abdominal:      General: Abdomen is flat. There is no distension.      Palpations: Abdomen is soft.      Tenderness: There is no abdominal tenderness.   Musculoskeletal:         General: No swelling or tenderness.      Cervical back: Normal range of motion and neck supple. No rigidity or tenderness.      Right lower leg: No edema.      Left lower leg: No edema.      Comments: Left foot dressing clean and dry.   Skin:     General: Skin is warm and dry.   Neurological:      General: No focal deficit present.      Mental Status: He is alert and oriented to person, place, and time. Mental status is at baseline.      Motor: No weakness.   Psychiatric:         Mood and Affect: Mood normal.         Behavior: Behavior normal.         Thought Content: Thought content normal.             Significant Labs: All pertinent labs within the past 24 hours have been reviewed.  BMP:   Recent Labs   Lab 06/30/24  0434   *      K 3.9      CO2 27   BUN 10   CREATININE 0.9   CALCIUM 8.9     CBC:   No results for input(s): "WBC", "HGB", "HCT", "PLT" in the last 48 hours.    CMP:   Recent Labs   Lab 06/29/24  0451 06/30/24  0434    139   K 3.7 3.9    104   CO2 27 27    114*   BUN 12 10   CREATININE 0.9 0.9   CALCIUM 8.6* 8.9   PROT 6.2 6.3   ALBUMIN 2.8* 2.9*   BILITOT 0.2 0.2   ALKPHOS 50* 47*   AST 72* 64*   ALT 77* 75*   ANIONGAP 10 8       Significant Imaging: I have reviewed all pertinent imaging results/findings within the past 24 hours.    Assessment/Plan:      * Cellulitis of left foot  -Podiatry Consult, appreciate recommendations; I and D on 06/27/2024  -wound care following  -MRI concerning for abscess and osteo  -cultures positive for MSSA  -continue IV antibiotics per ID recommendations  -analgesic prn  -reviewed labs  Working with case management to come up with plan for IV antibiotics      Obesity (BMI 35.0-39.9 without comorbidity)  Body mass index is 39.22 " kg/m². Morbid obesity complicates all aspects of disease management from diagnostic modalities to treatment. Weight loss encouraged and health benefits explained to patient.           VTE Risk Mitigation (From admission, onward)           Ordered     IP VTE HIGH RISK PATIENT  Once         06/27/24 1125     Place sequential compression device  Until discontinued         06/27/24 1125                    Discharge Planning   BECKY:      Code Status: Full Code   Is the patient medically ready for discharge?:     Reason for patient still in hospital (select all that apply): Pending disposition  Discharge Plan A: Home with family   Discharge Delays: (!) Medication Delay (Cost, Insurance) (uninsured)              Venessa Valdes MD, MD  Department of Hospital Medicine   [facility]/Surg

## 2024-06-30 NOTE — SUBJECTIVE & OBJECTIVE
Interval History: Patient states pain is controlled. No new complaints.    Review of Systems   Constitutional:  Negative for activity change, appetite change, chills and fever.   HENT:  Negative for congestion, ear pain, nosebleeds and sinus pain.    Eyes:  Negative for discharge and itching.   Respiratory:  Negative for apnea, cough, chest tightness and shortness of breath.    Cardiovascular:  Negative for chest pain, palpitations and leg swelling.   Gastrointestinal:  Negative for abdominal distention, abdominal pain and vomiting.   Genitourinary:  Negative for difficulty urinating, dysuria, flank pain and frequency.   Musculoskeletal:  Positive for arthralgias. Negative for back pain, joint swelling and myalgias.   Skin:  Negative for color change, pallor and rash.   Neurological:  Negative for dizziness, weakness, light-headedness and headaches.   Psychiatric/Behavioral:  Negative for agitation, behavioral problems, confusion and suicidal ideas.      Objective:     Vital Signs (Most Recent):  Temp: 98 °F (36.7 °C) (06/30/24 0809)  Pulse: 72 (06/30/24 0809)  Resp: 18 (06/30/24 0809)  BP: (!) 160/112 (06/30/24 0815)  SpO2: 95 % (06/30/24 0809) Vital Signs (24h Range):  Temp:  [98 °F (36.7 °C)-98.6 °F (37 °C)] 98 °F (36.7 °C)  Pulse:  [60-83] 72  Resp:  [18-20] 18  SpO2:  [95 %-97 %] 95 %  BP: (127-180)/() 160/112     Weight: 112.7 kg (248 lb 7.3 oz)  Body mass index is 37.78 kg/m².    Intake/Output Summary (Last 24 hours) at 6/30/2024 1132  Last data filed at 6/30/2024 0623  Gross per 24 hour   Intake 2416.52 ml   Output 2950 ml   Net -533.48 ml         Physical Exam  Vitals reviewed.   Constitutional:       General: He is not in acute distress.     Appearance: Normal appearance. He is normal weight. He is not ill-appearing.   HENT:      Head: Normocephalic and atraumatic.      Nose: Nose normal.      Mouth/Throat:      Mouth: Mucous membranes are moist.      Pharynx: Oropharynx is clear.   Eyes:       "General: No scleral icterus.     Extraocular Movements: Extraocular movements intact.      Conjunctiva/sclera: Conjunctivae normal.      Pupils: Pupils are equal, round, and reactive to light.   Cardiovascular:      Rate and Rhythm: Normal rate and regular rhythm.      Pulses: Normal pulses.      Heart sounds: Normal heart sounds. No murmur heard.     No gallop.   Pulmonary:      Effort: Pulmonary effort is normal. No respiratory distress.      Breath sounds: Normal breath sounds. No wheezing, rhonchi or rales.   Chest:      Chest wall: No tenderness.   Abdominal:      General: Abdomen is flat. There is no distension.      Palpations: Abdomen is soft.      Tenderness: There is no abdominal tenderness.   Musculoskeletal:         General: No swelling or tenderness.      Cervical back: Normal range of motion and neck supple. No rigidity or tenderness.      Right lower leg: No edema.      Left lower leg: No edema.      Comments: Left foot dressing clean and dry.   Skin:     General: Skin is warm and dry.   Neurological:      General: No focal deficit present.      Mental Status: He is alert and oriented to person, place, and time. Mental status is at baseline.      Motor: No weakness.   Psychiatric:         Mood and Affect: Mood normal.         Behavior: Behavior normal.         Thought Content: Thought content normal.             Significant Labs: All pertinent labs within the past 24 hours have been reviewed.  BMP:   Recent Labs   Lab 06/30/24  0434   *      K 3.9      CO2 27   BUN 10   CREATININE 0.9   CALCIUM 8.9     CBC:   No results for input(s): "WBC", "HGB", "HCT", "PLT" in the last 48 hours.    CMP:   Recent Labs   Lab 06/29/24  0451 06/30/24  0434    139   K 3.7 3.9    104   CO2 27 27    114*   BUN 12 10   CREATININE 0.9 0.9   CALCIUM 8.6* 8.9   PROT 6.2 6.3   ALBUMIN 2.8* 2.9*   BILITOT 0.2 0.2   ALKPHOS 50* 47*   AST 72* 64*   ALT 77* 75*   ANIONGAP 10 8 "       Significant Imaging: I have reviewed all pertinent imaging results/findings within the past 24 hours.

## 2024-06-30 NOTE — PLAN OF CARE
06/28/2024 10:09am Per [facility] rep Dasia notes, Approved ID [id number]. Effective 6.1.2024. waiting on bhp type to post. [facility] Reference #: [id number].      06/27/2024 1:11pm  Per [facility] rep Dasia notes, Submitted income statement. Per email from lior [email or web address]. Application was Approved. Please allow 24-48 business hours for processing. Will follow up     SW sent approved ID number to [employer]) via [employer] system.         06/30/24 1046   Post-Acute Status   Post-Acute Authorization IV Infusion   IV Infusion Status Post acute provider reviewing for acceptance

## 2024-06-30 NOTE — PLAN OF CARE
POC reviewed with pt, verbalized understanding. Pt AAO x 4, able to make needs known. Meds given per MAR. Midline intact and patent with IVF infusing as ordered. Purposeful q2hr rounding done. Safety maintained with side rails up x3, bed wheels locked, bed in lowest position, and call light in reach. Pt remains free of falls. No further needs expressed at this time. Will continue to monitor.

## 2024-06-30 NOTE — PLAN OF CARE
Problem: Adult Inpatient Plan of Care  Goal: Plan of Care Review  Outcome: Progressing  Goal: Patient-Specific Goal (Individualized)  Outcome: Progressing  Goal: Absence of Hospital-Acquired Illness or Injury  Outcome: Progressing  Goal: Optimal Comfort and Wellbeing  Outcome: Progressing  Goal: Readiness for Transition of Care  Outcome: Progressing     Problem: Infection  Goal: Absence of Infection Signs and Symptoms  Outcome: Progressing     Problem: Wound  Goal: Optimal Coping  Outcome: Progressing  Goal: Optimal Functional Ability  Outcome: Progressing  Goal: Absence of Infection Signs and Symptoms  Outcome: Progressing  Goal: Improved Oral Intake  Outcome: Progressing  Goal: Optimal Pain Control and Function  Outcome: Progressing  Goal: Skin Health and Integrity  Outcome: Progressing  Goal: Optimal Wound Healing  Outcome: Progressing     Problem: Pain Acute  Goal: Optimal Pain Control and Function  Outcome: Progressing     Problem: Fall Injury Risk  Goal: Absence of Fall and Fall-Related Injury  Outcome: Progressing     Problem: Skin or Soft Tissue Infection  Goal: Absence of Infection Signs and Symptoms  Outcome: Progressing     Problem: Mobility Impairment  Goal: Optimal Mobility  Outcome: Progressing   Plan of care reviewed with patient. Patient verbalized complete understanding. Two hour patient rounding maintained throughout shift. All fall precautions maintained. Bed in lowest position, locked, call light within reach. Side rails up x 2. Slip resistant socks maintained. Needs attended to.

## 2024-07-01 PROBLEM — L03.116 CELLULITIS OF LEFT LOWER EXTREMITY: Status: ACTIVE | Noted: 2024-07-01

## 2024-07-01 LAB
ALBUMIN SERPL BCP-MCNC: 2.9 G/DL (ref 3.5–5.2)
ALP SERPL-CCNC: 60 U/L (ref 55–135)
ALT SERPL W/O P-5'-P-CCNC: 56 U/L (ref 10–44)
ANION GAP SERPL CALC-SCNC: 11 MMOL/L (ref 8–16)
AST SERPL-CCNC: 41 U/L (ref 10–40)
BACTERIA TISS AEROBE CULT: NO GROWTH
BACTERIA TISS AEROBE CULT: NO GROWTH
BILIRUB SERPL-MCNC: 0.1 MG/DL (ref 0.1–1)
BUN SERPL-MCNC: 19 MG/DL (ref 6–20)
CALCIUM SERPL-MCNC: 9.4 MG/DL (ref 8.7–10.5)
CHLORIDE SERPL-SCNC: 106 MMOL/L (ref 95–110)
CO2 SERPL-SCNC: 23 MMOL/L (ref 23–29)
CREAT SERPL-MCNC: 1.1 MG/DL (ref 0.5–1.4)
EST. GFR  (NO RACE VARIABLE): >60 ML/MIN/1.73 M^2
GLUCOSE SERPL-MCNC: 129 MG/DL (ref 70–110)
GRAM STN SPEC: NORMAL
POTASSIUM SERPL-SCNC: 4 MMOL/L (ref 3.5–5.1)
PROT SERPL-MCNC: 6.7 G/DL (ref 6–8.4)
SODIUM SERPL-SCNC: 140 MMOL/L (ref 136–145)

## 2024-07-01 PROCEDURE — 25000003 PHARM REV CODE 250: Performed by: STUDENT IN AN ORGANIZED HEALTH CARE EDUCATION/TRAINING PROGRAM

## 2024-07-01 PROCEDURE — 94761 N-INVAS EAR/PLS OXIMETRY MLT: CPT

## 2024-07-01 PROCEDURE — 63600175 PHARM REV CODE 636 W HCPCS: Performed by: STUDENT IN AN ORGANIZED HEALTH CARE EDUCATION/TRAINING PROGRAM

## 2024-07-01 PROCEDURE — 80053 COMPREHEN METABOLIC PANEL: CPT

## 2024-07-01 PROCEDURE — 94799 UNLISTED PULMONARY SVC/PX: CPT

## 2024-07-01 PROCEDURE — 11000001 HC ACUTE MED/SURG PRIVATE ROOM

## 2024-07-01 PROCEDURE — 36415 COLL VENOUS BLD VENIPUNCTURE: CPT

## 2024-07-01 RX ADMIN — CEFAZOLIN 2 G: 2 INJECTION, POWDER, FOR SOLUTION INTRAMUSCULAR; INTRAVENOUS at 03:07

## 2024-07-01 RX ADMIN — CEFAZOLIN 2 G: 2 INJECTION, POWDER, FOR SOLUTION INTRAMUSCULAR; INTRAVENOUS at 09:07

## 2024-07-01 NOTE — CARE UPDATE
06/30/24 2008   Patient Assessment/Suction   Level of Consciousness (AVPU) alert   Respiratory Effort Normal;Unlabored   Expansion/Accessory Muscles/Retractions expansion symmetric   Rhythm/Pattern, Respiratory pattern regular   Cough Frequency no cough   PRE-TX-O2   Device (Oxygen Therapy) room air   SpO2 96 %   Pulse Oximetry Type Intermittent   $ Pulse Oximetry - Multiple Charge Pulse Oximetry - Multiple   Pulse 87   Resp 16

## 2024-07-01 NOTE — SUBJECTIVE & OBJECTIVE
Interval History: Patient states pain is controlled. No new events overnight.    Review of Systems   Constitutional:  Negative for activity change, appetite change, chills and fever.   HENT:  Negative for congestion, ear pain, nosebleeds and sinus pain.    Eyes:  Negative for discharge and itching.   Respiratory:  Negative for apnea, cough, chest tightness and shortness of breath.    Cardiovascular:  Negative for chest pain, palpitations and leg swelling.   Gastrointestinal:  Negative for abdominal distention, abdominal pain and vomiting.   Genitourinary:  Negative for difficulty urinating, dysuria, flank pain and frequency.   Musculoskeletal:  Positive for arthralgias. Negative for back pain, joint swelling and myalgias.   Skin:  Negative for color change, pallor and rash.   Neurological:  Negative for dizziness, weakness, light-headedness and headaches.   Psychiatric/Behavioral:  Negative for agitation, behavioral problems, confusion and suicidal ideas.      Objective:     Vital Signs (Most Recent):  Temp: 98.7 °F (37.1 °C) (07/01/24 1139)  Pulse: 81 (07/01/24 1139)  Resp: 18 (07/01/24 1139)  BP: (!) 142/98 (07/01/24 1200)  SpO2: 97 % (07/01/24 1139) Vital Signs (24h Range):  Temp:  [97.2 °F (36.2 °C)-98.7 °F (37.1 °C)] 98.7 °F (37.1 °C)  Pulse:  [] 81  Resp:  [16-18] 18  SpO2:  [70 %-97 %] 97 %  BP: (142-166)/() 142/98     Weight: 111.8 kg (246 lb 7.6 oz)  Body mass index is 37.48 kg/m².    Intake/Output Summary (Last 24 hours) at 7/1/2024 1242  Last data filed at 7/1/2024 0947  Gross per 24 hour   Intake 1630.9 ml   Output 1950 ml   Net -319.1 ml         Physical Exam  Vitals reviewed.   Constitutional:       General: He is not in acute distress.     Appearance: Normal appearance. He is normal weight. He is not ill-appearing.   HENT:      Head: Normocephalic and atraumatic.      Nose: Nose normal.      Mouth/Throat:      Mouth: Mucous membranes are moist.      Pharynx: Oropharynx is clear.   Eyes:     "  General: No scleral icterus.     Extraocular Movements: Extraocular movements intact.      Conjunctiva/sclera: Conjunctivae normal.      Pupils: Pupils are equal, round, and reactive to light.   Cardiovascular:      Rate and Rhythm: Normal rate and regular rhythm.      Pulses: Normal pulses.      Heart sounds: Normal heart sounds. No murmur heard.     No gallop.   Pulmonary:      Effort: Pulmonary effort is normal. No respiratory distress.      Breath sounds: Normal breath sounds. No wheezing, rhonchi or rales.   Chest:      Chest wall: No tenderness.   Abdominal:      General: Abdomen is flat. There is no distension.      Palpations: Abdomen is soft.      Tenderness: There is no abdominal tenderness.   Musculoskeletal:         General: No swelling or tenderness.      Cervical back: Normal range of motion and neck supple. No rigidity or tenderness.      Right lower leg: No edema.      Left lower leg: No edema.      Comments: Left foot dressing clean and dry.   Skin:     General: Skin is warm and dry.   Neurological:      General: No focal deficit present.      Mental Status: He is alert and oriented to person, place, and time. Mental status is at baseline.      Motor: No weakness.   Psychiatric:         Mood and Affect: Mood normal.         Behavior: Behavior normal.         Thought Content: Thought content normal.             Significant Labs: All pertinent labs within the past 24 hours have been reviewed.  BMP:   Recent Labs   Lab 07/01/24  0410   *      K 4.0      CO2 23   BUN 19   CREATININE 1.1   CALCIUM 9.4     CBC:   No results for input(s): "WBC", "HGB", "HCT", "PLT" in the last 48 hours.    CMP:   Recent Labs   Lab 06/30/24  0434 07/01/24  0410    140   K 3.9 4.0    106   CO2 27 23   * 129*   BUN 10 19   CREATININE 0.9 1.1   CALCIUM 8.9 9.4   PROT 6.3 6.7   ALBUMIN 2.9* 2.9*   BILITOT 0.2 0.1   ALKPHOS 47* 60   AST 64* 41*   ALT 75* 56*   ANIONGAP 8 11 "       Significant Imaging: I have reviewed all pertinent imaging results/findings within the past 24 hours.

## 2024-07-01 NOTE — PLAN OF CARE
[facility] to come to the hospital at 5pm today for home infusion teaching. HH referral sent to [facility] for line care and wound care. Will need HH and wound care orders prior to dc. Still anticipate dc tomorrow         07/01/24 1554   Post-Acute Status   Post-Acute Authorization Home Health;IV Infusion   Home Health Status Referrals Sent   IV Infusion Status Pending payor review/awaiting authorization (if required)

## 2024-07-01 NOTE — PROGRESS NOTES
[facility] Medicine  Progress Note    Patient Name: Salvador Bermudez  MRN: [id number]  Patient Class: IP- Inpatient   Admission Date: 6/24/2024  Length of Stay: 6 days  Attending Physician: Venessa Valdes MD  Primary Care Provider: Lyubov, Primary Doctor        Subjective:     Principal Problem:Cellulitis of left foot        HPI:  Salvador Bermudez is a 54 year old male with a past medical history of obesity who presents with complaints of left foot wound which started 3 months ago however worsening pain, swelling, and redness over the past 3 days. He reports 3 months ago he had a blister to the left foot after wearing a pair of new shoes while on a cruise. He began treatment with wound care however his left foot is more swollen.  ED workup:  Left foot x-ray concerning for possible bony involvement at the base of the left metatarsal.  CBC and BMP unremarkable. Lactic acid 1.2 and  ESR  pending. Started on vancomycin in ED. Left foot MRI pending. Podiatry consult placed.  Admitted to hospital medicine for further management and treatment.                        Overview/Hospital Course:  Patient presented with left foot wound was found to have osteo and abscess.  Underwent I&D.  Currently getting IV antibiotics.  Patient is growing MSSA.  Following ID recommendations.  Case management worked with patient to obtain Medicaid to pay for long term IV abx.     Interval History: Patient states pain is controlled. No new events overnight.    Review of Systems   Constitutional:  Negative for activity change, appetite change, chills and fever.   HENT:  Negative for congestion, ear pain, nosebleeds and sinus pain.    Eyes:  Negative for discharge and itching.   Respiratory:  Negative for apnea, cough, chest tightness and shortness of breath.    Cardiovascular:  Negative for chest pain, palpitations and leg swelling.   Gastrointestinal:  Negative for abdominal distention, abdominal pain and vomiting.    Genitourinary:  Negative for difficulty urinating, dysuria, flank pain and frequency.   Musculoskeletal:  Positive for arthralgias. Negative for back pain, joint swelling and myalgias.   Skin:  Negative for color change, pallor and rash.   Neurological:  Negative for dizziness, weakness, light-headedness and headaches.   Psychiatric/Behavioral:  Negative for agitation, behavioral problems, confusion and suicidal ideas.      Objective:     Vital Signs (Most Recent):  Temp: 98.7 °F (37.1 °C) (07/01/24 1139)  Pulse: 81 (07/01/24 1139)  Resp: 18 (07/01/24 1139)  BP: (!) 142/98 (07/01/24 1200)  SpO2: 97 % (07/01/24 1139) Vital Signs (24h Range):  Temp:  [97.2 °F (36.2 °C)-98.7 °F (37.1 °C)] 98.7 °F (37.1 °C)  Pulse:  [] 81  Resp:  [16-18] 18  SpO2:  [70 %-97 %] 97 %  BP: (142-166)/() 142/98     Weight: 111.8 kg (246 lb 7.6 oz)  Body mass index is 37.48 kg/m².    Intake/Output Summary (Last 24 hours) at 7/1/2024 1242  Last data filed at 7/1/2024 0947  Gross per 24 hour   Intake 1630.9 ml   Output 1950 ml   Net -319.1 ml         Physical Exam  Vitals reviewed.   Constitutional:       General: He is not in acute distress.     Appearance: Normal appearance. He is normal weight. He is not ill-appearing.   HENT:      Head: Normocephalic and atraumatic.      Nose: Nose normal.      Mouth/Throat:      Mouth: Mucous membranes are moist.      Pharynx: Oropharynx is clear.   Eyes:      General: No scleral icterus.     Extraocular Movements: Extraocular movements intact.      Conjunctiva/sclera: Conjunctivae normal.      Pupils: Pupils are equal, round, and reactive to light.   Cardiovascular:      Rate and Rhythm: Normal rate and regular rhythm.      Pulses: Normal pulses.      Heart sounds: Normal heart sounds. No murmur heard.     No gallop.   Pulmonary:      Effort: Pulmonary effort is normal. No respiratory distress.      Breath sounds: Normal breath sounds. No wheezing, rhonchi or rales.   Chest:      Chest wall:  "No tenderness.   Abdominal:      General: Abdomen is flat. There is no distension.      Palpations: Abdomen is soft.      Tenderness: There is no abdominal tenderness.   Musculoskeletal:         General: No swelling or tenderness.      Cervical back: Normal range of motion and neck supple. No rigidity or tenderness.      Right lower leg: No edema.      Left lower leg: No edema.      Comments: Left foot dressing clean and dry.   Skin:     General: Skin is warm and dry.   Neurological:      General: No focal deficit present.      Mental Status: He is alert and oriented to person, place, and time. Mental status is at baseline.      Motor: No weakness.   Psychiatric:         Mood and Affect: Mood normal.         Behavior: Behavior normal.         Thought Content: Thought content normal.             Significant Labs: All pertinent labs within the past 24 hours have been reviewed.  BMP:   Recent Labs   Lab 07/01/24  0410   *      K 4.0      CO2 23   BUN 19   CREATININE 1.1   CALCIUM 9.4     CBC:   No results for input(s): "WBC", "HGB", "HCT", "PLT" in the last 48 hours.    CMP:   Recent Labs   Lab 06/30/24  0434 07/01/24  0410    140   K 3.9 4.0    106   CO2 27 23   * 129*   BUN 10 19   CREATININE 0.9 1.1   CALCIUM 8.9 9.4   PROT 6.3 6.7   ALBUMIN 2.9* 2.9*   BILITOT 0.2 0.1   ALKPHOS 47* 60   AST 64* 41*   ALT 75* 56*   ANIONGAP 8 11       Significant Imaging: I have reviewed all pertinent imaging results/findings within the past 24 hours.    Assessment/Plan:      * Cellulitis of left foot  -Podiatry Consult, appreciate recommendations; I and D on 06/27/2024  -wound care following  -MRI concerning for abscess and osteo  -cultures positive for MSSA  -continue IV antibiotics per ID recommendations  -analgesic prn  -reviewed labs  Working with case management to come up with plan for IV antibiotics      Obesity (BMI 35.0-39.9 without comorbidity)  Body mass index is 39.22 kg/m². Morbid " obesity complicates all aspects of disease management from diagnostic modalities to treatment. Weight loss encouraged and health benefits explained to patient.           VTE Risk Mitigation (From admission, onward)           Ordered     IP VTE HIGH RISK PATIENT  Once         06/27/24 1125     Place sequential compression device  Until discontinued         06/27/24 1125                    Discharge Planning   BECKY: 7/2/2024     Code Status: Full Code   Is the patient medically ready for discharge?:     Reason for patient still in hospital (select all that apply):   Discharge Plan A: Home with family   Discharge Delays: (!) Medication Delay (Cost, Insurance)              Venessa Valdes MD, MD  Department of Hospital Medicine   [facility]/Surg

## 2024-07-01 NOTE — PLAN OF CARE
Met with pt at bedside to discuss dc planning. Pt was approved for [place] medicaid but we do not know which plan yet. [employer] infusion has accepted this pt. CM to set up midline dsg changes once we know which medicaid plan pt has. DC plan is home w family vs HH. Anticipate DC tomorrow. CM following       07/01/24 1047   Discharge Reassessment   Assessment Type Discharge Planning Reassessment   Did the patient's condition or plan change since previous assessment? Yes   Discharge Plan discussed with: Patient   Communicated BECKY with patient/caregiver Yes   Discharge Plan A Home with family   Discharge Plan B Home Health   DME Needed Upon Discharge  none   Transition of Care Barriers Unisured   Why the patient remains in the hospital Insurance issues   Post-Acute Status   Post-Acute Authorization IV Infusion;Home Health   Discharge Delays (!) Medication Delay (Cost, Insurance)

## 2024-07-02 VITALS
WEIGHT: 246.5 LBS | HEIGHT: 68 IN | OXYGEN SATURATION: 96 % | TEMPERATURE: 98 F | BODY MASS INDEX: 37.36 KG/M2 | RESPIRATION RATE: 18 BRPM | HEART RATE: 77 BPM | DIASTOLIC BLOOD PRESSURE: 86 MMHG | SYSTOLIC BLOOD PRESSURE: 134 MMHG

## 2024-07-02 LAB
ALBUMIN SERPL BCP-MCNC: 3 G/DL (ref 3.5–5.2)
ALP SERPL-CCNC: 53 U/L (ref 55–135)
ALT SERPL W/O P-5'-P-CCNC: 39 U/L (ref 10–44)
ANION GAP SERPL CALC-SCNC: 10 MMOL/L (ref 8–16)
AST SERPL-CCNC: 31 U/L (ref 10–40)
BILIRUB SERPL-MCNC: 0.2 MG/DL (ref 0.1–1)
BUN SERPL-MCNC: 16 MG/DL (ref 6–20)
CALCIUM SERPL-MCNC: 8.9 MG/DL (ref 8.7–10.5)
CHLORIDE SERPL-SCNC: 105 MMOL/L (ref 95–110)
CO2 SERPL-SCNC: 23 MMOL/L (ref 23–29)
CREAT SERPL-MCNC: 0.8 MG/DL (ref 0.5–1.4)
EST. GFR  (NO RACE VARIABLE): >60 ML/MIN/1.73 M^2
GLUCOSE SERPL-MCNC: 117 MG/DL (ref 70–110)
POTASSIUM SERPL-SCNC: 3.9 MMOL/L (ref 3.5–5.1)
PROT SERPL-MCNC: 6.6 G/DL (ref 6–8.4)
SODIUM SERPL-SCNC: 138 MMOL/L (ref 136–145)

## 2024-07-02 PROCEDURE — 36415 COLL VENOUS BLD VENIPUNCTURE: CPT

## 2024-07-02 PROCEDURE — 80053 COMPREHEN METABOLIC PANEL: CPT

## 2024-07-02 PROCEDURE — 25000003 PHARM REV CODE 250: Performed by: STUDENT IN AN ORGANIZED HEALTH CARE EDUCATION/TRAINING PROGRAM

## 2024-07-02 PROCEDURE — 63600175 PHARM REV CODE 636 W HCPCS: Performed by: STUDENT IN AN ORGANIZED HEALTH CARE EDUCATION/TRAINING PROGRAM

## 2024-07-02 RX ORDER — HYDROCODONE BITARTRATE AND ACETAMINOPHEN 10; 325 MG/1; MG/1
1 TABLET ORAL EVERY 6 HOURS PRN
Qty: 20 TABLET | Refills: 0 | Status: SHIPPED | OUTPATIENT
Start: 2024-07-02

## 2024-07-02 RX ADMIN — CEFAZOLIN 2 G: 2 INJECTION, POWDER, FOR SOLUTION INTRAMUSCULAR; INTRAVENOUS at 10:07

## 2024-07-02 RX ADMIN — CEFAZOLIN 2 G: 2 INJECTION, POWDER, FOR SOLUTION INTRAMUSCULAR; INTRAVENOUS at 04:07

## 2024-07-02 NOTE — PLAN OF CARE
Problem: Adult Inpatient Plan of Care  Goal: Plan of Care Review  Outcome: Met  Goal: Patient-Specific Goal (Individualized)  Outcome: Met  Goal: Absence of Hospital-Acquired Illness or Injury  Outcome: Met  Goal: Optimal Comfort and Wellbeing  Outcome: Met  Goal: Readiness for Transition of Care  Outcome: Met     Problem: Infection  Goal: Absence of Infection Signs and Symptoms  Outcome: Met     Problem: Wound  Goal: Optimal Coping  Outcome: Met  Goal: Optimal Functional Ability  Outcome: Met  Goal: Absence of Infection Signs and Symptoms  Outcome: Met  Goal: Improved Oral Intake  Outcome: Met  Goal: Optimal Pain Control and Function  Outcome: Met  Goal: Skin Health and Integrity  Outcome: Met  Goal: Optimal Wound Healing  Outcome: Met     Problem: Pain Acute  Goal: Optimal Pain Control and Function  Outcome: Met     Problem: Fall Injury Risk  Goal: Absence of Fall and Fall-Related Injury  Outcome: Met     Problem: Skin or Soft Tissue Infection  Goal: Absence of Infection Signs and Symptoms  Outcome: Met     Problem: Mobility Impairment  Goal: Optimal Mobility  Outcome: Met   POC has been discussed with pt.  No replacements needed today.  No falls during this shift.  No c/o pain voiced during this shift.  Pt is partial-weight bearing to LLE.  Pt is adequate for discharge home today.

## 2024-07-02 NOTE — PLAN OF CARE
07/01/24 2001   Patient Assessment/Suction   Level of Consciousness (AVPU) alert   Respiratory Effort Normal;Unlabored   Expansion/Accessory Muscles/Retractions no retractions;no use of accessory muscles   All Lung Fields Breath Sounds clear;Anterior:;Lateral:   Rhythm/Pattern, Respiratory depth regular;unlabored;pattern regular   Cough Frequency no cough   PRE-TX-O2   Device (Oxygen Therapy) room air   SpO2 96 %   Pulse Oximetry Type Intermittent   $ Pulse Oximetry - Multiple Charge Pulse Oximetry - Multiple   Pulse 84   Resp 18   Incentive Spirometer   $ Incentive Spirometer Charges done with encouragement   Incentive Spirometer Predicted Level (mL) 2120   Administration (IS) instruction provided, follow-up   Number of Repetitions (IS) 10   Level Incentive Spirometer (mL) 4000   Patient Tolerance (IS) good

## 2024-07-02 NOTE — NURSING
Pt discharged to home with wife at side.  Midline to stay in place for JESSIE at home.  Pt rolled down with w/c to vehicle.  Pt verbalized understanding of discharge orders.   no c/o pain/nausea/vomitting.  Will CTM.   Carried

## 2024-07-02 NOTE — ASSESSMENT & PLAN NOTE
Body mass index is 37.48 kg/m². Morbid obesity complicates all aspects of disease management from diagnostic modalities to treatment. Weight loss encouraged and health benefits explained to patient.

## 2024-07-02 NOTE — PLAN OF CARE
Pt received home infusion teaching yesterday   orders with ID recs and wound care/line care sent to [facility]   ID follow up on AVS  PCP appt was requested- they will contact pt for scheduling    Pt is clear for dc from CM if ok with wound care         07/02/24 1141   Final Note   Assessment Type Final Discharge Note   Anticipated Discharge Disposition [facility]   Hospital Resources/Appts/Education Provided Appointments scheduled and added to AVS;Post-Acute resouces added to AVS   Post-Acute Status   Post-Acute Authorization Home Health;IV Infusion   Home Health Status Set-up Complete/Auth obtained   IV Infusion Status Set-up Complete/Auth obtained

## 2024-07-02 NOTE — DISCHARGE INSTRUCTIONS
Wound care  Clean wound with wound cleanser and pat dry. Apply a cut piece of Urgotul silver mesh dressing and place over the incision site. May apply a cut piece of Aquacel Ag Advantage dressing and place to the plantar aspect to help control moisture if needed. Pad area with fluff gauze and loosely wrap with kerlix and secure with an ace wrap twice a week on Tuesdays and Fridays.  Follow up with Dr. Green for post op care.  Patient will need to be non weight bearing to the left foot, until sutures are removed in approximately 3 weeks.   [facility]

## 2024-07-02 NOTE — DISCHARGE SUMMARY
[facility] Medicine  Discharge Summary      Patient Name: Salvador Bermudez  MRN: [id number]  JAMIE: [id number]  Patient Class: IP- Inpatient  Admission Date: 6/24/2024  Hospital Length of Stay: 7 days  Discharge Date and Time:  07/02/2024 11:39 AM  Attending Physician: Venessa Valdes MD   Discharging Provider: Venessa Valdes MD, MD  Primary Care Provider: Lyubov, Primary Doctor    Primary Care Team: Networked reference to record PCT     HPI:   Salvador Bermudez is a 54 year old male with a past medical history of obesity who presents with complaints of left foot wound which started 3 months ago however worsening pain, swelling, and redness over the past 3 days. He reports 3 months ago he had a blister to the left foot after wearing a pair of new shoes while on a cruise. He began treatment with wound care however his left foot is more swollen.  ED workup:  Left foot x-ray concerning for possible bony involvement at the base of the left metatarsal.  CBC and BMP unremarkable. Lactic acid 1.2 and  ESR  pending. Started on vancomycin in ED. Left foot MRI pending. Podiatry consult placed.  Admitted to hospital medicine for further management and treatment.                        Procedure(s) (LRB):  DEBRIDEMENT, FOOT (Left)      Hospital Course:   Patient presented with left foot wound was found to have osteo and abscess.  Underwent I&D.  Currently getting IV antibiotics.  Patient is growing MSSA.  Following ID recommendations.  Case management worked with patient to obtain Medicaid to pay for long term IV abx. This was approved and the patient was discharged home in stable condition.     Goals of Care Treatment Preferences:  Code Status: Full Code      Consults:   Consults (From admission, onward)          Status Ordering Provider     Inpatient consult to   Once        Provider:  (Not yet assigned)    Acknowledged SCOTTIE NGUYỄN     Inpatient consult to Midline team   Once        Provider:  (Not yet assigned)    Completed ZAIN CASTRO     Inpatient consult to Podiatry  Once        Provider:  Trinity Green DPM    Acknowledged BUNNY DOBSON     Inpatient consult to Infectious Diseases  Once        Provider:  Aníbal Morrison Jr., MD    Completed ANÍBAL MORRISON JR     Inpatient consult to Podiatry  Once        Provider:  Trinity Green DPM    Acknowledged BUNNY DOBSON            ID  * Cellulitis of left foot  D/C on 6 weeks cefazolin as per ID recs      Cellulitis of left lower extremity  As per above notes      Endocrine  Obesity (BMI 35.0-39.9 without comorbidity)  Body mass index is 37.48 kg/m². Morbid obesity complicates all aspects of disease management from diagnostic modalities to treatment. Weight loss encouraged and health benefits explained to patient.           Final Active Diagnoses:    Diagnosis Date Noted POA    PRINCIPAL PROBLEM:  Cellulitis of left foot [L03.116] 06/25/2024 Yes    Cellulitis of left lower extremity [L03.116] 07/01/2024 Yes    Obesity (BMI 35.0-39.9 without comorbidity) [E66.9] 06/25/2024 Yes      Problems Resolved During this Admission:       Discharged Condition: stable    Disposition: Home or Self Care    Follow Up:   Follow-up Information       [facility] Follow up.    Why: email sent requesting pcp hospital follow up  Contact information:  [address] [facility] [address]  [phone]               Trinity Green DPM. Schedule an appointment as soon as possible for a visit.    Specialties: Podiatry, Surgery, Wound Care  Contact information:  [address] [facility]  [address]  [facility] POD[facility]AT[facility]  [facility] [address]  [phone]               Carolynn Alicia MD Follow up.    Specialty: Infectious Diseases  Why: 7/17 @ 4pm  Contact information:  [address] [facility]  [address]  [place] [address]  [phone]               [facility] Follow up.    Why: [facility]  home  infusion  Contact information:  [address]  [facility] [address]  [phone]             [facility] Follow up.    Specialties: Home Health Services, Home Therapy Services, Home Living Aide Services  Contact information:  [address]  [facility] [address]  [phone]                         Patient Instructions:      Ambulatory referral/consult to Wound Clinic   Standing Status: Future   Referral Priority: Routine Referral Type: Consultation   Referral Reason: Specialty Services Required   Requested Specialty: Wound Care   Number of Visits Requested: 1     Ambulatory referral/consult to Outpatient Case Management   Referral Priority: Routine Referral Type: Consultation   Referral Reason: Specialty Services Required   Number of Visits Requested: 1     Ambulatory referral/consult to Home Health   Standing Status: Future   Referral Priority: Routine Referral Type: Home Health   Referral Reason: Specialty Services Required   Requested Specialty: Home Health Services   Number of Visits Requested: 1     Diet Adult Regular     Activity as tolerated       Significant Diagnostic Studies: N/A    Pending Diagnostic Studies:       None           Medications:  Reconciled Home Medications:      Medication List        START taking these medications      D5W PgBk 50 mL with ceFAZolin 2 gram SolR 8 g  Inject 8 g into the vein once daily.     HYDROcodone-acetaminophen  mg per tablet  Commonly known as: NORCO  Take 1 tablet by mouth every 6 (six) hours as needed for Pain.            CONTINUE taking these medications      EXCEDRIN EXTRA STRENGTH 250-250-65 mg per tablet  Generic drug: aspirin-acetaminophen-caffeine 250-250-65 mg  Take 1 tablet by mouth every 6 (six) hours as needed for Pain.     mupirocin 2 % ointment  Commonly known as: BACTROBAN  Apply 1 g topically 2 (two) times daily.              Indwelling Lines/Drains at time of discharge:   Lines/Drains/Airways        None                   Time spent on the discharge of patient: 30 minutes         Venessa Valdes MD, MD  Department of Hospital Medicine  [facility]/Surg

## 2024-07-02 NOTE — PLAN OF CARE
Problem: Adult Inpatient Plan of Care  Goal: Plan of Care Review  Outcome: Progressing  Goal: Patient-Specific Goal (Individualized)  Outcome: Progressing  Goal: Absence of Hospital-Acquired Illness or Injury  Outcome: Progressing  Goal: Optimal Comfort and Wellbeing  Outcome: Progressing  Goal: Readiness for Transition of Care  Outcome: Progressing     Problem: Infection  Goal: Absence of Infection Signs and Symptoms  Outcome: Progressing     Problem: Wound  Goal: Optimal Coping  Outcome: Progressing  Goal: Optimal Functional Ability  Outcome: Progressing  Goal: Absence of Infection Signs and Symptoms  Outcome: Progressing  Goal: Improved Oral Intake  Outcome: Progressing  Goal: Optimal Pain Control and Function  Outcome: Progressing  Goal: Skin Health and Integrity  Outcome: Progressing  Goal: Optimal Wound Healing  Outcome: Progressing     Problem: Pain Acute  Goal: Optimal Pain Control and Function  Outcome: Progressing     Problem: Fall Injury Risk  Goal: Absence of Fall and Fall-Related Injury  Outcome: Progressing     Problem: Skin or Soft Tissue Infection  Goal: Absence of Infection Signs and Symptoms  Outcome: Progressing     Problem: Mobility Impairment  Goal: Optimal Mobility  Outcome: Progressing   Plan of care reviewed with patient. Patient verbalized understanding. All fall precautions maintained. Bed in lowest position, call light within reach, slip resistant socks maintained. Bed alarm on.

## 2024-07-02 NOTE — CONSULTS
Wound care has been following this patient along with podiatry. Wound care will continue to follow this patient throughout hospital stay. Need updated post op orders from Dr. Green for discharge planning.

## 2024-07-03 PROBLEM — T82.524A DISPLACEMENT OF PERIPHERALLY INSERTED CENTRAL CATHETER (PICC): Status: ACTIVE | Noted: 2024-07-03

## 2024-07-09 ENCOUNTER — OFFICE VISIT (OUTPATIENT)
Dept: PODIATRY | Facility: CLINIC | Age: 54
End: 2024-07-09
Payer: MEDICAID

## 2024-07-09 VITALS — WEIGHT: 241.19 LBS | HEIGHT: 68 IN | BODY MASS INDEX: 36.55 KG/M2

## 2024-07-09 DIAGNOSIS — M86.172 OTHER ACUTE OSTEOMYELITIS OF LEFT FOOT: Primary | ICD-10-CM

## 2024-07-09 PROCEDURE — 99024 POSTOP FOLLOW-UP VISIT: CPT | Mod: ,,, | Performed by: PODIATRIST

## 2024-07-09 PROCEDURE — 1160F RVW MEDS BY RX/DR IN RCRD: CPT | Mod: CPTII,,, | Performed by: PODIATRIST

## 2024-07-09 PROCEDURE — 3044F HG A1C LEVEL LT 7.0%: CPT | Mod: CPTII,,, | Performed by: PODIATRIST

## 2024-07-09 PROCEDURE — 99213 OFFICE O/P EST LOW 20 MIN: CPT | Mod: PBBFAC,PN | Performed by: PODIATRIST

## 2024-07-09 PROCEDURE — 1159F MED LIST DOCD IN RCRD: CPT | Mod: CPTII,,, | Performed by: PODIATRIST

## 2024-07-09 PROCEDURE — 99999 PR PBB SHADOW E&M-EST. PATIENT-LVL III: CPT | Mod: PBBFAC,,, | Performed by: PODIATRIST

## 2024-07-09 NOTE — PROGRESS NOTES
"  [address]  [facility] [address] [place] [address]  Phone: [phone]   [phone]    Patient's PCP:No, Primary Doctor  Referring Provider: Aaareferral Self    Subjective:      Chief Complaint:: Follow-up (Follow up on left foot I and D)    Follow-up  Associated symptoms include numbness. Pertinent negatives include no abdominal pain, arthralgias, chest pain, chills, coughing, fatigue, fever, headaches, joint swelling, myalgias, nausea, neck pain, rash or weakness.     Salvador Bermudez is a 54 y.o. male who presents today with a follow up on I and D on left foot wound. Pt has no new complaints, pt stated the swelling and tenderness have gone down. Pt states dressing were changed Saturday.    Vitals:    07/09/24 1002   Weight: 109.4 kg (241 lb 2.9 oz)   Height: 5' 8" (1.727 m)   PainSc: 0-No pain      Shoe Size: 8    Past Surgical History:   Procedure Laterality Date    DEBRIDEMENT OF FOOT Left 6/27/2024    Procedure: DEBRIDEMENT, FOOT;  Surgeon: Jaime Green DPM;  Location: [facility];  Service: Podiatry;  Laterality: Left;     Past Medical History:   Diagnosis Date    Obese      No family history on file.     Social History:   Marital Status:   Alcohol History:  reports current alcohol use of about 6.0 standard drinks of alcohol per week.  Tobacco History:  reports that he has never smoked. He does not have any smokeless tobacco history on file.  Drug History:  has no history on file for drug use.    Review of patient's allergies indicates:  No Known Allergies    Current Outpatient Medications   Medication Sig Dispense Refill    aspirin-acetaminophen-caffeine 250-250-65 mg (EXCEDRIN EXTRA STRENGTH) 250-250-65 mg per tablet Take 1 tablet by mouth every 6 (six) hours as needed for Pain.      D5W PgBk 50 mL with ceFAZolin 2 gram SolR 8 g Inject 8 g into the vein once daily.      HYDROcodone-acetaminophen (NORCO)  mg per tablet Take 1 tablet by mouth every 6 (six) hours as needed for Pain. 20 tablet 0 "    mupirocin (BACTROBAN) 2 % ointment Apply 1 g topically 2 (two) times daily.       No current facility-administered medications for this visit.       Review of Systems   Constitutional:  Negative for chills, fatigue, fever and unexpected weight change.   HENT:  Negative for hearing loss and trouble swallowing.    Eyes:  Negative for photophobia and visual disturbance.   Respiratory:  Negative for cough, shortness of breath and wheezing.    Cardiovascular:  Negative for chest pain, palpitations and leg swelling.   Gastrointestinal:  Negative for abdominal pain and nausea.   Genitourinary:  Negative for dysuria and frequency.   Musculoskeletal:  Negative for arthralgias, back pain, gait problem, joint swelling, myalgias and neck pain.   Skin:  Positive for color change and wound. Negative for rash.   Neurological:  Positive for numbness. Negative for tremors, seizures, weakness and headaches.   Hematological:  Does not bruise/bleed easily.         Objective:        Physical Exam:   Foot Exam    General  Orientation: alert and oriented to person, place, and time   Affect: appropriate   Gait: antalgic       Left Foot/Ankle      Inspection and Palpation  Ecchymosis: none  Tenderness: none   Swelling: (Mild edema of the foot)  Arch: pes cavus  Neurovascular  Dorsalis pedis: 2+  Posterior tibial: 2+  Capillary refill: 2+  Saphenous nerve sensation: diminished  Tibial nerve sensation: diminished  Superficial peroneal nerve sensation: diminished  Deep peroneal nerve sensation: diminished  Sural nerve sensation: diminished    Comments  Sutures intact to the dorsal and plantar wounds on the left foot.    Physical Exam  Cardiovascular:      Pulses:           Dorsalis pedis pulses are 2+ on the left side.        Posterior tibial pulses are 2+ on the left side.               Left Ankle/Foot Exam     Comments:  Sutures intact to the dorsal and plantar wounds on the left foot.      Vascular Exam       Left Pulses  Dorsalis Pedis:       2+  Posterior Tibial:      2+           Imaging: none            Assessment:       1. Other acute osteomyelitis of left foot      Plan:   Other acute osteomyelitis of left foot      Follow up in about 1 week (around 7/16/2024), or if symptoms worsen or fail to improve.    Procedures        I discussed with the patient that his foot appears to be progressing well.  He was redressed today.  He will remain in the cam walker boot with limited weight-bearing.  Continue antibiotics per Infectious Disease.    CAM walker boot with LIMITED weight bearing  Ice to painful area, 15 minutes at a time  Ace-wrap to help with swelling  No barefoot   Keep affected extremity elevated while seated      Counseling:     I provided patient education verbally regarding:   Patient diagnosis, treatment options, as well as alternatives, risks, and benefits.     This note was created using Dragon voice recognition software that occasionally misinterpreted phrases or words.

## 2024-07-11 LAB
FUNGUS SPEC CULT: NORMAL

## 2024-07-15 ENCOUNTER — LAB VISIT (OUTPATIENT)
Dept: LAB | Facility: HOSPITAL | Age: 54
End: 2024-07-15
Attending: STUDENT IN AN ORGANIZED HEALTH CARE EDUCATION/TRAINING PROGRAM
Payer: MEDICAID

## 2024-07-15 DIAGNOSIS — Z79.2 ENCOUNTER FOR LONG-TERM (CURRENT) USE OF ANTIBIOTICS: ICD-10-CM

## 2024-07-15 DIAGNOSIS — L02.612 ABSCESS OF LEFT FOOT: ICD-10-CM

## 2024-07-15 DIAGNOSIS — L03.116 CELLULITIS OF LEFT FOOT: Primary | ICD-10-CM

## 2024-07-15 LAB
ALBUMIN SERPL BCP-MCNC: 3.7 G/DL (ref 3.5–5.2)
ALP SERPL-CCNC: 67 U/L (ref 55–135)
ALT SERPL W/O P-5'-P-CCNC: 8 U/L (ref 10–44)
ANION GAP SERPL CALC-SCNC: 11 MMOL/L (ref 8–16)
AST SERPL-CCNC: 21 U/L (ref 10–40)
BASOPHILS # BLD AUTO: 0.06 K/UL (ref 0–0.2)
BASOPHILS NFR BLD: 1.2 % (ref 0–1.9)
BILIRUB SERPL-MCNC: 0.4 MG/DL (ref 0.1–1)
BUN SERPL-MCNC: 21 MG/DL (ref 6–20)
CALCIUM SERPL-MCNC: 9.3 MG/DL (ref 8.7–10.5)
CHLORIDE SERPL-SCNC: 105 MMOL/L (ref 95–110)
CO2 SERPL-SCNC: 23 MMOL/L (ref 23–29)
CREAT SERPL-MCNC: 0.8 MG/DL (ref 0.5–1.4)
CRP SERPL-MCNC: 10.2 MG/L (ref 0–8.2)
DIFFERENTIAL METHOD BLD: ABNORMAL
EOSINOPHIL # BLD AUTO: 0.2 K/UL (ref 0–0.5)
EOSINOPHIL NFR BLD: 3.5 % (ref 0–8)
ERYTHROCYTE [DISTWIDTH] IN BLOOD BY AUTOMATED COUNT: 12.2 % (ref 11.5–14.5)
EST. GFR  (NO RACE VARIABLE): >60 ML/MIN/1.73 M^2
GLUCOSE SERPL-MCNC: 92 MG/DL (ref 70–110)
HCT VFR BLD AUTO: 48 % (ref 40–54)
HGB BLD-MCNC: 15.6 G/DL (ref 14–18)
IMM GRANULOCYTES # BLD AUTO: 0.03 K/UL (ref 0–0.04)
IMM GRANULOCYTES NFR BLD AUTO: 0.6 % (ref 0–0.5)
LYMPHOCYTES # BLD AUTO: 2.2 K/UL (ref 1–4.8)
LYMPHOCYTES NFR BLD: 45.3 % (ref 18–48)
MCH RBC QN AUTO: 30.8 PG (ref 27–31)
MCHC RBC AUTO-ENTMCNC: 32.5 G/DL (ref 32–36)
MCV RBC AUTO: 95 FL (ref 82–98)
MONOCYTES # BLD AUTO: 0.5 K/UL (ref 0.3–1)
MONOCYTES NFR BLD: 10.8 % (ref 4–15)
NEUTROPHILS # BLD AUTO: 1.9 K/UL (ref 1.8–7.7)
NEUTROPHILS NFR BLD: 38.6 % (ref 38–73)
NRBC BLD-RTO: 0 /100 WBC
PLATELET # BLD AUTO: 259 K/UL (ref 150–450)
PMV BLD AUTO: 10.2 FL (ref 9.2–12.9)
POTASSIUM SERPL-SCNC: 3.9 MMOL/L (ref 3.5–5.1)
PROT SERPL-MCNC: 7.4 G/DL (ref 6–8.4)
RBC # BLD AUTO: 5.07 M/UL (ref 4.6–6.2)
SODIUM SERPL-SCNC: 139 MMOL/L (ref 136–145)
WBC # BLD AUTO: 4.9 K/UL (ref 3.9–12.7)

## 2024-07-15 PROCEDURE — 86140 C-REACTIVE PROTEIN: CPT | Performed by: STUDENT IN AN ORGANIZED HEALTH CARE EDUCATION/TRAINING PROGRAM

## 2024-07-15 PROCEDURE — 85025 COMPLETE CBC W/AUTO DIFF WBC: CPT | Performed by: STUDENT IN AN ORGANIZED HEALTH CARE EDUCATION/TRAINING PROGRAM

## 2024-07-15 PROCEDURE — 80053 COMPREHEN METABOLIC PANEL: CPT | Performed by: STUDENT IN AN ORGANIZED HEALTH CARE EDUCATION/TRAINING PROGRAM

## 2024-07-16 ENCOUNTER — OFFICE VISIT (OUTPATIENT)
Dept: PODIATRY | Facility: CLINIC | Age: 54
End: 2024-07-16
Payer: MEDICAID

## 2024-07-16 VITALS — WEIGHT: 241.19 LBS | HEIGHT: 68 IN | BODY MASS INDEX: 36.55 KG/M2

## 2024-07-16 DIAGNOSIS — M86.172 OTHER ACUTE OSTEOMYELITIS OF LEFT FOOT: Primary | ICD-10-CM

## 2024-07-16 PROCEDURE — 99024 POSTOP FOLLOW-UP VISIT: CPT | Mod: ,,, | Performed by: PODIATRIST

## 2024-07-16 PROCEDURE — 99213 OFFICE O/P EST LOW 20 MIN: CPT | Mod: PBBFAC,PN | Performed by: PODIATRIST

## 2024-07-16 PROCEDURE — 99999 PR PBB SHADOW E&M-EST. PATIENT-LVL III: CPT | Mod: PBBFAC,,, | Performed by: PODIATRIST

## 2024-07-16 PROCEDURE — 3044F HG A1C LEVEL LT 7.0%: CPT | Mod: CPTII,,, | Performed by: PODIATRIST

## 2024-07-16 PROCEDURE — 1160F RVW MEDS BY RX/DR IN RCRD: CPT | Mod: CPTII,,, | Performed by: PODIATRIST

## 2024-07-16 PROCEDURE — 1159F MED LIST DOCD IN RCRD: CPT | Mod: CPTII,,, | Performed by: PODIATRIST

## 2024-07-16 NOTE — PROGRESS NOTES
"  [address]  [facility] [address] [place] [address]  Phone: [phone]   [phone]    Patient's PCP:No, Primary Doctor  Referring Provider: No ref. provider found    Subjective:      Chief Complaint:: Follow-up (osteomyelitis of left foot/)    Follow-up  Associated symptoms include numbness. Pertinent negatives include no abdominal pain, arthralgias, chest pain, chills, coughing, fatigue, fever, headaches, joint swelling, myalgias, nausea, neck pain, rash or weakness.     Salvador Bermudez is a 54 y.o. male who presents today with a follow up on I and D on left foot wound and osteomyelitis of left foot. Pt has no new complaints, pt stated the  Pt states dressing are changed twice a week by wound care.    Vitals:    07/16/24 1006   Weight: 109.4 kg (241 lb 2.9 oz)   Height: 5' 8" (1.727 m)   PainSc: 0-No pain      Shoe Size: 8    Past Surgical History:   Procedure Laterality Date    DEBRIDEMENT OF FOOT Left 6/27/2024    Procedure: DEBRIDEMENT, FOOT;  Surgeon: Jaime Green DPM;  Location: [facility];  Service: Podiatry;  Laterality: Left;     Past Medical History:   Diagnosis Date    Obese      No family history on file.     Social History:   Marital Status:   Alcohol History:  reports current alcohol use of about 6.0 standard drinks of alcohol per week.  Tobacco History:  reports that he has never smoked. He does not have any smokeless tobacco history on file.  Drug History:  has no history on file for drug use.    Review of patient's allergies indicates:  No Known Allergies    Current Outpatient Medications   Medication Sig Dispense Refill    aspirin-acetaminophen-caffeine 250-250-65 mg (EXCEDRIN EXTRA STRENGTH) 250-250-65 mg per tablet Take 1 tablet by mouth every 6 (six) hours as needed for Pain.      D5W PgBk 50 mL with ceFAZolin 2 gram SolR 8 g Inject 8 g into the vein once daily.      HYDROcodone-acetaminophen (NORCO)  mg per tablet Take 1 tablet by mouth every 6 (six) hours as needed for Pain. " 20 tablet 0    mupirocin (BACTROBAN) 2 % ointment Apply 1 g topically 2 (two) times daily.       No current facility-administered medications for this visit.       Review of Systems   Constitutional:  Negative for chills, fatigue, fever and unexpected weight change.   HENT:  Negative for hearing loss and trouble swallowing.    Eyes:  Negative for photophobia and visual disturbance.   Respiratory:  Negative for cough, shortness of breath and wheezing.    Cardiovascular:  Negative for chest pain, palpitations and leg swelling.   Gastrointestinal:  Negative for abdominal pain and nausea.   Genitourinary:  Negative for dysuria and frequency.   Musculoskeletal:  Negative for arthralgias, back pain, gait problem, joint swelling, myalgias and neck pain.   Skin:  Positive for color change. Negative for rash and wound.   Neurological:  Positive for numbness. Negative for tremors, seizures, weakness and headaches.   Hematological:  Does not bruise/bleed easily.         Objective:        Physical Exam:   Foot Exam    General  Orientation: alert and oriented to person, place, and time   Affect: appropriate   Gait: antalgic       Left Foot/Ankle      Inspection and Palpation  Ecchymosis: none  Tenderness: none   Swelling: (Mild edema of the foot)  Arch: pes cavus  Neurovascular  Dorsalis pedis: 2+  Posterior tibial: 2+  Capillary refill: 2+  Saphenous nerve sensation: diminished  Tibial nerve sensation: diminished  Superficial peroneal nerve sensation: diminished  Deep peroneal nerve sensation: diminished  Sural nerve sensation: diminished    Comments  Sutures intact to the dorsal and plantar wounds on the left foot.    Physical Exam  Cardiovascular:      Pulses:           Dorsalis pedis pulses are 2+ on the left side.        Posterior tibial pulses are 2+ on the left side.               Left Ankle/Foot Exam     Comments:  Sutures intact to the dorsal and plantar wounds on the left foot.      Vascular Exam       Left  Pulses  Dorsalis Pedis:      2+  Posterior Tibial:      2+           Imaging:            Assessment:       1. Other acute osteomyelitis of left foot      Plan:   Other acute osteomyelitis of left foot      Follow up in about 1 week (around 7/23/2024), or if symptoms worsen or fail to improve.    Procedures        I discussed with the patient that overall his foot is progressing well.  I did remove the sutures from the dorsal incision.  Majority of the incision is healed with only partial-thickness wound area.  I removed a large amount of the callus skin underlying the plantar foot.  I discussed them that he does need to continue in the cam walker boot and continue to limit his weight-bearing as much as possible.  He will continue on antibiotics per Infectious Disease.  Hopefully will be able to remove the remainder of the stitches next week.    Counseling:     I provided patient education verbally regarding:   Patient diagnosis, treatment options, as well as alternatives, risks, and benefits.     This note was created using Dragon voice recognition software that occasionally misinterpreted phrases or words.

## 2024-07-17 ENCOUNTER — OFFICE VISIT (OUTPATIENT)
Dept: INFECTIOUS DISEASES | Facility: CLINIC | Age: 54
End: 2024-07-17
Payer: MEDICAID

## 2024-07-17 VITALS
WEIGHT: 243.88 LBS | BODY MASS INDEX: 36.96 KG/M2 | TEMPERATURE: 97 F | HEART RATE: 79 BPM | SYSTOLIC BLOOD PRESSURE: 138 MMHG | HEIGHT: 68 IN | DIASTOLIC BLOOD PRESSURE: 82 MMHG

## 2024-07-17 DIAGNOSIS — A49.01 MSSA (METHICILLIN SUSCEPTIBLE STAPHYLOCOCCUS AUREUS) INFECTION: Primary | ICD-10-CM

## 2024-07-17 DIAGNOSIS — M86.271 SUBACUTE OSTEOMYELITIS OF RIGHT FOOT: ICD-10-CM

## 2024-07-17 DIAGNOSIS — L03.116 CELLULITIS OF LEFT FOOT: ICD-10-CM

## 2024-07-17 PROCEDURE — 3008F BODY MASS INDEX DOCD: CPT | Mod: CPTII,S$GLB,, | Performed by: STUDENT IN AN ORGANIZED HEALTH CARE EDUCATION/TRAINING PROGRAM

## 2024-07-17 PROCEDURE — 3075F SYST BP GE 130 - 139MM HG: CPT | Mod: CPTII,S$GLB,, | Performed by: STUDENT IN AN ORGANIZED HEALTH CARE EDUCATION/TRAINING PROGRAM

## 2024-07-17 PROCEDURE — 1111F DSCHRG MED/CURRENT MED MERGE: CPT | Mod: CPTII,S$GLB,, | Performed by: STUDENT IN AN ORGANIZED HEALTH CARE EDUCATION/TRAINING PROGRAM

## 2024-07-17 PROCEDURE — 1159F MED LIST DOCD IN RCRD: CPT | Mod: CPTII,S$GLB,, | Performed by: STUDENT IN AN ORGANIZED HEALTH CARE EDUCATION/TRAINING PROGRAM

## 2024-07-17 PROCEDURE — 3044F HG A1C LEVEL LT 7.0%: CPT | Mod: CPTII,S$GLB,, | Performed by: STUDENT IN AN ORGANIZED HEALTH CARE EDUCATION/TRAINING PROGRAM

## 2024-07-17 PROCEDURE — 99215 OFFICE O/P EST HI 40 MIN: CPT | Mod: S$GLB,,, | Performed by: STUDENT IN AN ORGANIZED HEALTH CARE EDUCATION/TRAINING PROGRAM

## 2024-07-17 PROCEDURE — 3079F DIAST BP 80-89 MM HG: CPT | Mod: CPTII,S$GLB,, | Performed by: STUDENT IN AN ORGANIZED HEALTH CARE EDUCATION/TRAINING PROGRAM

## 2024-07-17 RX ORDER — CEFADROXIL 500 MG/1
1 CAPSULE ORAL EVERY 12 HOURS
Qty: 120 CAPSULE | Refills: 0 | Status: SHIPPED | OUTPATIENT
Start: 2024-07-17 | End: 2024-08-16

## 2024-07-17 NOTE — PROGRESS NOTES
[facility] - Infectious Diseases   Department of Infectious Disease  Office Visit Note        PATIENT NAME: Salvador Bermudez  YOB: 1970   MRN: [id number]  DATE OF VISIT: 07/17/2024    REASON FOR VISIT: Hospital Follow Up      HISTORY OF PRESENT ILLNESS     Salvador Bermudez is a 54 y.o. male , morbidly obese, BMI 37 kg/m2, with no prior medical history known to our service from prior admission to [place] on 06/25 for left foot abscess.  MRI showed abscess and osteomyelitis of 5th metatarsal head status post I&D and washout with excision of 5th metatarsal head on 06/27.  Bedside cultures from 06/25 x2 sets grew MSSA.  OR cultures no growth.  He was discharged on Ancef continuous infusion 8g IV daily through August 8th.      He is here for follow-up, mentions he has been followed weekly by Podiatry, left foot is improving.  2 tiny stitches left at the base of the plantar surface of the 5th metatarsal head.  He denies any fever chills, no nausea or vomit, no abdominal pain, no dysuria increased urinary frequency, no changes in bowel movements.  Patient would like to hold off on establishing care with primary care wants this left foot infection is completely resolved.      Weekly labs reviewed, CRP at the hospital 88.3, down to 7.1, this week was 10.2 slightly higher but continues to improve.  Normal white count, no left shift, stable H&H and platelet count.  Stable electrolytes, normal kidney function, normal LFTs.    Outdoor activities:  Nonsmoker, drinks at least 6 to 12 beers a day, owns for auto part stores.  Lives with his wife at home, has a dog.  Travel:  None  Implants:  None  Antibiotic history:  Ancef 6/27/24--present    Social History  Marital Status:   Alcohol History:  reports current alcohol use of about 6.0 standard drinks of alcohol per week.  Tobacco History:  reports that he has never smoked. He does not have any smokeless tobacco history on file.  Drug History:  has  no history on file for drug use.    INTERVAL HISTORY     7/17/24:  Patient is here for follow-up, mentions his left foot is much better.  He would really like to have his IV medication discontinued today.  Discussed at length, we are by the middle of therapy we still need to complete 6 weeks total for left metatarsal osteomyelitis in the setting of prior foot abscess.  He denies any constitutional symptoms, overall he is doing much better.    ALLERGIES AND CURRENT MEDICATIONS     Review of patient's allergies indicates:  No Known Allergies    Current Outpatient Medications   Medication Sig Dispense Refill    aspirin-acetaminophen-caffeine 250-250-65 mg (EXCEDRIN EXTRA STRENGTH) 250-250-65 mg per tablet Take 1 tablet by mouth every 6 (six) hours as needed for Pain. (Patient not taking: Reported on 7/17/2024)      cefadroxil (DURICEF) 500 MG Cap Take 2 capsules (1 g total) by mouth every 12 (twelve) hours. 120 capsule 0    HYDROcodone-acetaminophen (NORCO)  mg per tablet Take 1 tablet by mouth every 6 (six) hours as needed for Pain. (Patient not taking: Reported on 7/17/2024) 20 tablet 0    mupirocin (BACTROBAN) 2 % ointment Apply 1 g topically 2 (two) times daily. (Patient not taking: Reported on 7/17/2024)       No current facility-administered medications for this visit.       MEDICAL/SURGICAL/FAMILY HISTORY     Past Surgical History:   Procedure Laterality Date    DEBRIDEMENT OF FOOT Left 6/27/2024    Procedure: DEBRIDEMENT, FOOT;  Surgeon: Jaime Green DPM;  Location: [facility];  Service: Podiatry;  Laterality: Left;     Past Medical History:   Diagnosis Date    Obese      No family history on file.     REVIEW OF SYSTEMS     Review of Systems  Constitutional:  Denies fevers, chills, night sweats, loss of appetite.  HEENT: Denies visual changes,ear pain, sinus congestion, mouth pain or trouble swallowing, sore throat or  dental pain.  Neck: Denies neck pain or lumps.  Respiratory: Denies shortness of breath,  coughing, wheezing or hemoptysis.  Cardiovascular:  Denies chest pain, palpitations or edema.  Gastrointestinal: Denies  nausea, vomiting, constipation or diarrhea.  Genitourinary:  Denies dysuria, frequency, urgency or hematuria   Musculoskeletal:  Denies joint pain or swelling, difficulty walking.  Left foot resolving SSTI/abscess status post I&D by Podiatry, improving, wearing a special boot.  Skin:  Denies rash or itching.  Neurologic:  Denies motor or sensory loss, headaches or dizziness.    Psychiatric:  Denies changes in mood or behavior.    PHYSICAL EXAM     Vital Signs  Wt Readings from Last 3 Encounters:   07/17/24 110.6 kg (243 lb 14.4 oz)   07/16/24 109.4 kg (241 lb 2.9 oz)   07/09/24 109.4 kg (241 lb 2.9 oz)     Temp Readings from Last 3 Encounters:   07/17/24 97.3 °F (36.3 °C)   07/03/24 97.8 °F (36.6 °C) (Oral)   07/02/24 97.9 °F (36.6 °C)     BP Readings from Last 3 Encounters:   07/17/24 138/82   07/03/24 129/71   07/02/24 134/86     Pulse Readings from Last 3 Encounters:   07/17/24 79   07/03/24 78   07/02/24 77       Physical Exam  General:  Morbidly obese  male, lying in bed in no acute distress, breathing comfortable on room air  Eyes: Eyes with no icterus or injection. Vision grossly normal  Ears: Hearing grossly normal.  Nose: Nares patent  Mouth: Moist mucous membranes, dentition is good. No ulcerations, erythema or exudates.  Neck: Supple, no tenderness to palpation.  Cardiovascular: Regular rate and rhythm, no murmurs, no edema.    Respiratory:  Clear to auscultation bilaterally, no tachypnea or increased work of breathing.  Gastrointestinal:  Soft with active bowel sounds, no tenderness to palpation, no distention.  Genitourinary:  No suprapubic tenderness.  Musculoskeletal:  Moves all extremities with good strength.    Skin:  Warm and dry, left foot with desquamating skin noted, wounds mostly healed, plantar aspect off prior wound with few stitches left, cellulitis resolved,  improved, nontender to palpation.  Dressing changed at bedside.  Neuro:   Oriented, conversant, follows commands.  Psych: Good mood, normal affect.   VAD:  L Midline removed at bedside      WOUND     7/17:           6/25:           VASCULAR ACCESS DEVICE     Left midline removed today    LABS AND DIAGNOSTICS       Significant Labs: I have reviewed all relevant and available labs and microbiology. .    CBC 7/15/24:   Latest Reference Range & Units 07/15/24 14:14   WBC 3.90 - 12.70 K/uL 4.90   RBC 4.60 - 6.20 M/uL 5.07   Hemoglobin 14.0 - 18.0 g/dL 15.6   Hematocrit 40.0 - 54.0 % 48.0   MCV 82 - 98 fL 95   MCH 27.0 - 31.0 pg 30.8   MCHC 32.0 - 36.0 g/dL 32.5   RDW 11.5 - 14.5 % 12.2   Platelet Count 150 - 450 K/uL 259   MPV 9.2 - 12.9 fL 10.2   Gran % 38.0 - 73.0 % 38.6   Lymph % 18.0 - 48.0 % 45.3   Mono % 4.0 - 15.0 % 10.8   Eos % 0.0 - 8.0 % 3.5   Basophil % 0.0 - 1.9 % 1.2   Immature Granulocytes 0.0 - 0.5 % 0.6 (H)   Gran # (ANC) 1.8 - 7.7 K/uL 1.9   Lymph # 1.0 - 4.8 K/uL 2.2   Mono # 0.3 - 1.0 K/uL 0.5   Eos # 0.0 - 0.5 K/uL 0.2   Baso # 0.00 - 0.20 K/uL 0.06   Immature Grans (Abs) 0.00 - 0.04 K/uL 0.03   nRBC 0 /100 WBC 0   Differential Method  Automated   (H): Data is abnormally high        CHEMISTRY 7/15/24:   Latest Reference Range & Units 07/15/24 14:14   Sodium 136 - 145 mmol/L 139   Potassium 3.5 - 5.1 mmol/L 3.9   Chloride 95 - 110 mmol/L 105   CO2 23 - 29 mmol/L 23   Anion Gap 8 - 16 mmol/L 11   BUN 6 - 20 mg/dL 21 (H)   Creatinine 0.5 - 1.4 mg/dL 0.8   eGFR >60 mL/min/1.73 m^2 >60.0   Glucose 70 - 110 mg/dL 92   Calcium 8.7 - 10.5 mg/dL 9.3   ALP 55 - 135 U/L 67   PROTEIN TOTAL 6.0 - 8.4 g/dL 7.4   Albumin 3.5 - 5.2 g/dL 3.7   BILIRUBIN TOTAL 0.1 - 1.0 mg/dL 0.4   AST 10 - 40 U/L 21   ALT 10 - 44 U/L 8 (L)   (H): Data is abnormally high  (L): Data is abnormally low      Estimated Creatinine Clearance: 130.3 mL/min (based on SCr of 0.8 mg/dL).    INFLAMMATORY/PROCAL  LAST 7 DAYS    No results found  "for: "ESR"  CRP   Date Value Ref Range Status   07/15/2024 10.2 (H) 0.0 - 8.2 mg/L Final   07/09/2024 7.1 0.0 - 8.2 mg/L Final   06/25/2024 88.3 (H) 0.0 - 8.2 mg/L Final   09/21/2021 0.80 (H) <0.76 mg/dL Final       PRIOR  MICROBIOLOGY:    Susceptibility data from last 90 days.  Collected Specimen Info Organism Ceftriaxone Clindamycin Erythromycin Oxacillin Penicillin Tetracycline Trimeth/Sulfa Vancomycin   06/25/24 Wound from Foot, Left Staphylococcus aureus           06/25/24 Wound from Foot, Left Staphylococcus aureus  S  S  S  S  R  S  S  S   06/25/24 Blood from Peripheral, Forearm, Right No growth after 5 days.           06/25/24 Blood from Peripheral, Forearm, Left No growth after 5 days.               LAST 7 DAYS MICROBIOLOGY   Microbiology Results (last 7 days)       ** No results found for the last 168 hours. **            PATHOLOGY - NA      CURRENT/PREVIOUS VISIT EKG  Results for orders placed or performed during the hospital encounter of 07/03/24   EKG 12-lead    Collection Time: 07/03/24  5:11 PM   Result Value Ref Range    QRS Duration 102 ms    [facility] QTC Calculation 439 ms    Narrative    Test Reason : R42,    Vent. Rate : 069 BPM     Atrial Rate : 069 BPM     P-R Int : 178 ms          QRS Dur : 102 ms      QT Int : 410 ms       P-R-T Axes : 038 026 052 degrees     QTc Int : 439 ms    Normal sinus rhythm with sinus arrhythmia  Incomplete right bundle branch block  Borderline Abnormal ECG  Confirmed by Corina MCCLENDON, Saba Mota ([id number]) on 7/8/2024 11:13:47 PM    Referred By: JOEY   SELF           Confirmed By:Saba Arriaga MD       Significant Diagnostics: I have reviewed all relevant and available diagnostic results.      ASSESSMENT AND PLAN:     H/o severe left foot SSTI with abscess and osteomyelitis 5th metatarsal head s/p I&D and excision of 5th metatarsal head 6/27 - improving              MRI with left 5th metatarsal head osteomyelitis and possible small abscess              Arterial " ultrasound negative for significant obstructive disease              Hemoglobin A1c 5.5% normal  6/25 Blood cultures x2 sets no growth   6/25 bedside cultures MSSA  6/27 OR cultures no growth  MRSA nasal swab negative   ESR 80, CRP 88.3-->10.2, trending down  Completed 3 weeks of IV antibiotics for MSSA left foot abscess  Midline removed today   Continue Duricef 1 g p.o. twice a day for 3 weeks through August 8th   Continue wound care   Weekly labs to be done at [facility]  RTC in 3 weeks     Daily alcohol intake      MSSA (methicillin susceptible Staphylococcus aureus) infection  -     cefadroxil (DURICEF) 500 MG Cap; Take 2 capsules (1 g total) by mouth every 12 (twelve) hours.  Dispense: 120 capsule; Refill: 0  -     CBC Auto Differential; Standing  -     Comprehensive Metabolic Panel; Standing  -     C-Reactive Protein; Standing    Subacute osteomyelitis of right foot  -     cefadroxil (DURICEF) 500 MG Cap; Take 2 capsules (1 g total) by mouth every 12 (twelve) hours.  Dispense: 120 capsule; Refill: 0    Cellulitis of left foot        Follow up in about 3 weeks (around 8/7/2024).      I spent a total of 40 minutes on the day of the visit.This includes face to face time and non-face to face time preparing to see the patient (eg, review of tests), obtaining and/or reviewing separately obtained history, documenting clinical information in the electronic or other health record, independently interpreting results and communicating results to the patient/family/caregiver, or care coordinator.     Carolynn Armenta MD  Date of Service: 07/17/2024      This note was created using Mix[employer]  voice recognition software that occasionally misinterpreted phrases or words.

## 2024-07-24 ENCOUNTER — LAB VISIT (OUTPATIENT)
Dept: LAB | Facility: HOSPITAL | Age: 54
End: 2024-07-24
Attending: STUDENT IN AN ORGANIZED HEALTH CARE EDUCATION/TRAINING PROGRAM
Payer: MEDICAID

## 2024-07-24 ENCOUNTER — OFFICE VISIT (OUTPATIENT)
Dept: PODIATRY | Facility: CLINIC | Age: 54
End: 2024-07-24
Payer: MEDICAID

## 2024-07-24 VITALS — OXYGEN SATURATION: 99 % | BODY MASS INDEX: 36.62 KG/M2 | WEIGHT: 241.63 LBS | HEIGHT: 68 IN

## 2024-07-24 DIAGNOSIS — A49.01 MSSA (METHICILLIN SUSCEPTIBLE STAPHYLOCOCCUS AUREUS) INFECTION: ICD-10-CM

## 2024-07-24 DIAGNOSIS — M86.172 OTHER ACUTE OSTEOMYELITIS OF LEFT FOOT: Primary | ICD-10-CM

## 2024-07-24 LAB
ALBUMIN SERPL BCP-MCNC: 4 G/DL (ref 3.5–5.2)
ALP SERPL-CCNC: 69 U/L (ref 55–135)
ALT SERPL W/O P-5'-P-CCNC: 11 U/L (ref 10–44)
ANION GAP SERPL CALC-SCNC: 7 MMOL/L (ref 8–16)
AST SERPL-CCNC: 16 U/L (ref 10–40)
BASOPHILS # BLD AUTO: 0.06 K/UL (ref 0–0.2)
BASOPHILS NFR BLD: 1.2 % (ref 0–1.9)
BILIRUB SERPL-MCNC: 0.5 MG/DL (ref 0.1–1)
BUN SERPL-MCNC: 14 MG/DL (ref 6–20)
CALCIUM SERPL-MCNC: 8.9 MG/DL (ref 8.7–10.5)
CHLORIDE SERPL-SCNC: 104 MMOL/L (ref 95–110)
CO2 SERPL-SCNC: 27 MMOL/L (ref 23–29)
CREAT SERPL-MCNC: 0.8 MG/DL (ref 0.5–1.4)
CRP SERPL-MCNC: 0.7 MG/DL
DIFFERENTIAL METHOD BLD: ABNORMAL
EOSINOPHIL # BLD AUTO: 0.1 K/UL (ref 0–0.5)
EOSINOPHIL NFR BLD: 2.6 % (ref 0–8)
ERYTHROCYTE [DISTWIDTH] IN BLOOD BY AUTOMATED COUNT: 12.1 % (ref 11.5–14.5)
EST. GFR  (NO RACE VARIABLE): >60 ML/MIN/1.73 M^2
GLUCOSE SERPL-MCNC: 118 MG/DL (ref 70–110)
HCT VFR BLD AUTO: 45.5 % (ref 40–54)
HGB BLD-MCNC: 14.9 G/DL (ref 14–18)
IMM GRANULOCYTES # BLD AUTO: 0.03 K/UL (ref 0–0.04)
IMM GRANULOCYTES NFR BLD AUTO: 0.6 % (ref 0–0.5)
LYMPHOCYTES # BLD AUTO: 1.9 K/UL (ref 1–4.8)
LYMPHOCYTES NFR BLD: 38.1 % (ref 18–48)
MCH RBC QN AUTO: 30.9 PG (ref 27–31)
MCHC RBC AUTO-ENTMCNC: 32.7 G/DL (ref 32–36)
MCV RBC AUTO: 94 FL (ref 82–98)
MONOCYTES # BLD AUTO: 0.5 K/UL (ref 0.3–1)
MONOCYTES NFR BLD: 10 % (ref 4–15)
NEUTROPHILS # BLD AUTO: 2.3 K/UL (ref 1.8–7.7)
NEUTROPHILS NFR BLD: 47.5 % (ref 38–73)
NRBC BLD-RTO: 0 /100 WBC
PLATELET # BLD AUTO: 225 K/UL (ref 150–450)
PMV BLD AUTO: 9.8 FL (ref 9.2–12.9)
POTASSIUM SERPL-SCNC: 3.9 MMOL/L (ref 3.5–5.1)
PROT SERPL-MCNC: 7.1 G/DL (ref 6–8.4)
RBC # BLD AUTO: 4.82 M/UL (ref 4.6–6.2)
SODIUM SERPL-SCNC: 138 MMOL/L (ref 136–145)
WBC # BLD AUTO: 4.91 K/UL (ref 3.9–12.7)

## 2024-07-24 PROCEDURE — 99024 POSTOP FOLLOW-UP VISIT: CPT | Mod: ,,, | Performed by: PODIATRIST

## 2024-07-24 PROCEDURE — 99999 PR PBB SHADOW E&M-EST. PATIENT-LVL III: CPT | Mod: PBBFAC,,, | Performed by: PODIATRIST

## 2024-07-24 PROCEDURE — 1160F RVW MEDS BY RX/DR IN RCRD: CPT | Mod: CPTII,,, | Performed by: PODIATRIST

## 2024-07-24 PROCEDURE — 80053 COMPREHEN METABOLIC PANEL: CPT | Performed by: STUDENT IN AN ORGANIZED HEALTH CARE EDUCATION/TRAINING PROGRAM

## 2024-07-24 PROCEDURE — 36415 COLL VENOUS BLD VENIPUNCTURE: CPT | Performed by: STUDENT IN AN ORGANIZED HEALTH CARE EDUCATION/TRAINING PROGRAM

## 2024-07-24 PROCEDURE — 85025 COMPLETE CBC W/AUTO DIFF WBC: CPT | Performed by: STUDENT IN AN ORGANIZED HEALTH CARE EDUCATION/TRAINING PROGRAM

## 2024-07-24 PROCEDURE — 3044F HG A1C LEVEL LT 7.0%: CPT | Mod: CPTII,,, | Performed by: PODIATRIST

## 2024-07-24 PROCEDURE — 86140 C-REACTIVE PROTEIN: CPT | Performed by: STUDENT IN AN ORGANIZED HEALTH CARE EDUCATION/TRAINING PROGRAM

## 2024-07-24 PROCEDURE — 99213 OFFICE O/P EST LOW 20 MIN: CPT | Mod: PBBFAC,PN | Performed by: PODIATRIST

## 2024-07-24 PROCEDURE — 1159F MED LIST DOCD IN RCRD: CPT | Mod: CPTII,,, | Performed by: PODIATRIST

## 2024-07-24 NOTE — PROGRESS NOTES
"  [address]  [facility] [address] [place] [address]  Phone: [phone]   [phone]    Patient's PCP:No, Primary Doctor  Referring Provider: No ref. provider found    Subjective:      Chief Complaint:: Follow-up (Follow up on left foot I and D 6/27/2024)    Follow-up  Associated symptoms include numbness. Pertinent negatives include no abdominal pain, arthralgias, chest pain, chills, coughing, fatigue, fever, headaches, joint swelling, myalgias, nausea, neck pain, rash or weakness.     Salvador Bermudez is a 54 y.o. male who presents today with a complaint of Follow up on left foot I and D . The current episode started 6/27/2024.  PT presents in surgery shoe and bandage.    Vitals:    07/24/24 0856   SpO2: 99%   Weight: 109.6 kg (241 lb 10 oz)   Height: 5' 8" (1.727 m)   PainSc: 0-No pain      Shoe Size: 8    Past Surgical History:   Procedure Laterality Date    DEBRIDEMENT OF FOOT Left 6/27/2024    Procedure: DEBRIDEMENT, FOOT;  Surgeon: Jaime Green DPM;  Location: [facility];  Service: Podiatry;  Laterality: Left;     Past Medical History:   Diagnosis Date    Obese      No family history on file.     Social History:   Marital Status:   Alcohol History:  reports current alcohol use of about 6.0 standard drinks of alcohol per week.  Tobacco History:  reports that he has never smoked. He does not have any smokeless tobacco history on file.  Drug History:  has no history on file for drug use.    Review of patient's allergies indicates:  No Known Allergies    Current Outpatient Medications   Medication Sig Dispense Refill    aspirin-acetaminophen-caffeine 250-250-65 mg (EXCEDRIN EXTRA STRENGTH) 250-250-65 mg per tablet Take 1 tablet by mouth every 6 (six) hours as needed for Pain. (Patient not taking: Reported on 7/17/2024)      cefadroxil (DURICEF) 500 MG Cap Take 2 capsules (1 g total) by mouth every 12 (twelve) hours. 120 capsule 0    HYDROcodone-acetaminophen (NORCO)  mg per tablet Take 1 tablet by " mouth every 6 (six) hours as needed for Pain. (Patient not taking: Reported on 7/17/2024) 20 tablet 0    mupirocin (BACTROBAN) 2 % ointment Apply 1 g topically 2 (two) times daily. (Patient not taking: Reported on 7/17/2024)       No current facility-administered medications for this visit.       Review of Systems   Constitutional:  Negative for chills, fatigue, fever and unexpected weight change.   HENT:  Negative for hearing loss and trouble swallowing.    Eyes:  Negative for photophobia and visual disturbance.   Respiratory:  Negative for cough, shortness of breath and wheezing.    Cardiovascular:  Negative for chest pain, palpitations and leg swelling.   Gastrointestinal:  Negative for abdominal pain and nausea.   Genitourinary:  Negative for dysuria and frequency.   Musculoskeletal:  Negative for arthralgias, back pain, gait problem, joint swelling, myalgias and neck pain.   Skin:  Positive for color change. Negative for rash and wound.   Neurological:  Positive for numbness. Negative for tremors, seizures, weakness and headaches.   Hematological:  Does not bruise/bleed easily.         Objective:        Physical Exam:   Foot Exam    General  Orientation: alert and oriented to person, place, and time   Affect: appropriate   Gait: antalgic       Left Foot/Ankle      Inspection and Palpation  Ecchymosis: none  Tenderness: none   Swelling: (Mild edema of the foot)  Arch: pes cavus  Neurovascular  Dorsalis pedis: 2+  Posterior tibial: 2+  Capillary refill: 2+  Saphenous nerve sensation: diminished  Tibial nerve sensation: diminished  Superficial peroneal nerve sensation: diminished  Deep peroneal nerve sensation: diminished  Sural nerve sensation: diminished    Comments  Sutures intact to the dorsal and plantar wounds on the left foot.    Physical Exam  Cardiovascular:      Pulses:           Dorsalis pedis pulses are 2+ on the left side.        Posterior tibial pulses are 2+ on the left side.               Left  Ankle/Foot Exam     Comments:  Sutures intact to the dorsal and plantar wounds on the left foot.      Vascular Exam       Left Pulses  Dorsalis Pedis:      2+  Posterior Tibial:      2+           Imaging: none                      Assessment:       1. Other acute osteomyelitis of left foot      Plan:   Other acute osteomyelitis of left foot      Follow up in about 2 weeks (around 8/7/2024), or if symptoms worsen or fail to improve.    Procedures remaining sutures removed from the left foot.  Plantar wound is fully healed.  There is a very small partial-thickness area on the dorsal aspect without signs of infection.    Recommend patient continue with the heavily padded dressing for the next 2 weeks.  He is okay to continue with limited weight-bearing.  If he continues to do well into weeks then he will likely be able to discontinue dressings and work on transitioning to normal shoes and increasing activities.          Counseling:     I provided patient education verbally regarding:   Patient diagnosis, treatment options, as well as alternatives, risks, and benefits.     This note was created using Dragon voice recognition software that occasionally misinterpreted phrases or words.

## 2024-07-31 ENCOUNTER — PATIENT OUTREACH (OUTPATIENT)
Dept: ADMINISTRATIVE | Facility: OTHER | Age: 54
End: 2024-07-31
Payer: MEDICAID

## 2024-07-31 NOTE — PROGRESS NOTES
CHW - Initial Contact    This Community Health Worker completed the Social Determinant of Health questionnaire with patient via telephone today.    Pt identified barriers of most importance are: Pt states no services are needed at this time.   Referrals were put through [facility] - no: Pt states no services are needed at this time.  Support and Services:   Other information discussed the patient needs / wants help with: nothing at this time.   Follow up required:   Initial Outreach - Due: 8/16/2024

## 2024-08-07 ENCOUNTER — OFFICE VISIT (OUTPATIENT)
Dept: INFECTIOUS DISEASES | Facility: CLINIC | Age: 54
End: 2024-08-07
Payer: MEDICAID

## 2024-08-07 ENCOUNTER — OFFICE VISIT (OUTPATIENT)
Dept: PODIATRY | Facility: CLINIC | Age: 54
End: 2024-08-07
Payer: MEDICAID

## 2024-08-07 VITALS — BODY MASS INDEX: 36.75 KG/M2 | HEIGHT: 68 IN | WEIGHT: 242.5 LBS

## 2024-08-07 VITALS
DIASTOLIC BLOOD PRESSURE: 72 MMHG | TEMPERATURE: 98 F | HEART RATE: 99 BPM | BODY MASS INDEX: 37.15 KG/M2 | WEIGHT: 245.13 LBS | HEIGHT: 68 IN | SYSTOLIC BLOOD PRESSURE: 134 MMHG

## 2024-08-07 DIAGNOSIS — M86.271 SUBACUTE OSTEOMYELITIS OF RIGHT FOOT: Primary | ICD-10-CM

## 2024-08-07 DIAGNOSIS — Z89.439: ICD-10-CM

## 2024-08-07 DIAGNOSIS — Q66.71 PES CAVUS OF BOTH FEET: ICD-10-CM

## 2024-08-07 DIAGNOSIS — A49.01 MSSA (METHICILLIN SUSCEPTIBLE STAPHYLOCOCCUS AUREUS) INFECTION: ICD-10-CM

## 2024-08-07 DIAGNOSIS — L03.116 CELLULITIS OF LEFT LOWER EXTREMITY: ICD-10-CM

## 2024-08-07 DIAGNOSIS — L85.1 ACQUIRED KERATODERMA: ICD-10-CM

## 2024-08-07 DIAGNOSIS — Q66.72 PES CAVUS OF BOTH FEET: ICD-10-CM

## 2024-08-07 DIAGNOSIS — G60.9 IDIOPATHIC PERIPHERAL NEUROPATHY: Primary | ICD-10-CM

## 2024-08-07 PROCEDURE — 3075F SYST BP GE 130 - 139MM HG: CPT | Mod: CPTII,,, | Performed by: STUDENT IN AN ORGANIZED HEALTH CARE EDUCATION/TRAINING PROGRAM

## 2024-08-07 PROCEDURE — 3078F DIAST BP <80 MM HG: CPT | Mod: CPTII,,, | Performed by: STUDENT IN AN ORGANIZED HEALTH CARE EDUCATION/TRAINING PROGRAM

## 2024-08-07 PROCEDURE — 3044F HG A1C LEVEL LT 7.0%: CPT | Mod: CPTII,,, | Performed by: STUDENT IN AN ORGANIZED HEALTH CARE EDUCATION/TRAINING PROGRAM

## 2024-08-07 PROCEDURE — 3044F HG A1C LEVEL LT 7.0%: CPT | Mod: CPTII,,, | Performed by: PODIATRIST

## 2024-08-07 PROCEDURE — 99213 OFFICE O/P EST LOW 20 MIN: CPT | Mod: 24,S$PBB,, | Performed by: PODIATRIST

## 2024-08-07 PROCEDURE — 99214 OFFICE O/P EST MOD 30 MIN: CPT | Mod: ,,, | Performed by: STUDENT IN AN ORGANIZED HEALTH CARE EDUCATION/TRAINING PROGRAM

## 2024-08-07 PROCEDURE — 99999 PR PBB SHADOW E&M-EST. PATIENT-LVL II: CPT | Mod: PBBFAC,,, | Performed by: PODIATRIST

## 2024-08-07 PROCEDURE — 1159F MED LIST DOCD IN RCRD: CPT | Mod: CPTII,,, | Performed by: PODIATRIST

## 2024-08-07 PROCEDURE — 3008F BODY MASS INDEX DOCD: CPT | Mod: CPTII,,, | Performed by: STUDENT IN AN ORGANIZED HEALTH CARE EDUCATION/TRAINING PROGRAM

## 2024-08-07 PROCEDURE — 3008F BODY MASS INDEX DOCD: CPT | Mod: CPTII,,, | Performed by: PODIATRIST

## 2024-08-07 PROCEDURE — 99212 OFFICE O/P EST SF 10 MIN: CPT | Mod: PBBFAC,PN | Performed by: PODIATRIST

## 2024-08-07 PROCEDURE — 1160F RVW MEDS BY RX/DR IN RCRD: CPT | Mod: CPTII,,, | Performed by: PODIATRIST

## 2025-03-19 ENCOUNTER — OFFICE VISIT (OUTPATIENT)
Dept: PODIATRY | Facility: CLINIC | Age: 55
End: 2025-03-19
Payer: MEDICAID

## 2025-03-19 VITALS — WEIGHT: 245.13 LBS | BODY MASS INDEX: 37.28 KG/M2

## 2025-03-19 DIAGNOSIS — L97.512 ULCER OF RIGHT FOOT WITH FAT LAYER EXPOSED: ICD-10-CM

## 2025-03-19 DIAGNOSIS — G60.9 IDIOPATHIC PERIPHERAL NEUROPATHY: Primary | ICD-10-CM

## 2025-03-19 DIAGNOSIS — Q66.72 PES CAVUS OF BOTH FEET: ICD-10-CM

## 2025-03-19 DIAGNOSIS — Q66.71 PES CAVUS OF BOTH FEET: ICD-10-CM

## 2025-03-19 PROCEDURE — 99999 PR PBB SHADOW E&M-EST. PATIENT-LVL III: CPT | Mod: PBBFAC,,, | Performed by: PODIATRIST

## 2025-03-19 PROCEDURE — 99213 OFFICE O/P EST LOW 20 MIN: CPT | Mod: PBBFAC,PN | Performed by: PODIATRIST

## 2025-03-19 PROCEDURE — 11042 DBRDMT SUBQ TIS 1ST 20SQCM/<: CPT | Mod: PBBFAC,PN | Performed by: PODIATRIST

## 2025-03-19 RX ORDER — CLINDAMYCIN HYDROCHLORIDE 300 MG/1
300 CAPSULE ORAL EVERY 8 HOURS
Qty: 42 CAPSULE | Refills: 0 | Status: SHIPPED | OUTPATIENT
Start: 2025-03-19 | End: 2025-04-02

## 2025-03-19 RX ORDER — MUPIROCIN 20 MG/G
OINTMENT TOPICAL 3 TIMES DAILY
Qty: 30 G | Refills: 3 | Status: SHIPPED | OUTPATIENT
Start: 2025-03-19

## 2025-03-19 NOTE — PROGRESS NOTES
[address] [facility] [address]  [place] [address]  Phone: [phone]   [phone]    Patient's PCP:No, Primary Doctor  Referring Provider: No ref. provider found    Subjective:      Chief Complaint:: Foot Problem (RT ulcer)    GABI Bermudez is a 54 y.o. male who presents today with a complaint of RT possible ulcer. The current episode started 3-4 weeks.  The symptoms include drainage, stabbing pain. Probable cause of complaint unknown.  The symptoms are aggravated by walking, pressure. The problem has worsened. Treatment to date have included cleaning which provided some relief.         Vitals:    03/19/25 1031   Weight: 111.2 kg (245 lb 2.4 oz)   PainSc:   3      Shoe Size: 8    Past Surgical History:   Procedure Laterality Date    DEBRIDEMENT OF FOOT Left 6/27/2024    Procedure: DEBRIDEMENT, FOOT;  Surgeon: Jaime Green DPM;  Location: [facility];  Service: Podiatry;  Laterality: Left;     Past Medical History:   Diagnosis Date    Obese      No family history on file.     Social History:   Marital Status:   Alcohol History:  reports current alcohol use of about 6.0 standard drinks of alcohol per week.  Tobacco History:  reports that he has never smoked. He does not have any smokeless tobacco history on file.  Drug History:  has no history on file for drug use.    Review of patient's allergies indicates:  No Known Allergies    Current Medications[1]    Review of Systems   Constitutional:  Negative for chills, fatigue, fever and unexpected weight change.   HENT:  Negative for hearing loss and trouble swallowing.    Eyes:  Negative for photophobia and visual disturbance.   Respiratory:  Negative for cough, shortness of breath and wheezing.    Cardiovascular:  Negative for chest pain, palpitations and leg swelling.   Gastrointestinal:  Negative for abdominal pain and nausea.   Genitourinary:  Negative for dysuria and frequency.   Musculoskeletal:  Negative for arthralgias, back pain, gait problem,  joint swelling, myalgias and neck pain.   Skin:  Positive for color change and wound. Negative for rash.   Neurological:  Positive for numbness. Negative for tremors, seizures, weakness and headaches.   Hematological:  Does not bruise/bleed easily.   Psychiatric/Behavioral:  Negative for hallucinations.          Objective:        Physical Exam:   Foot Exam    General  General Appearance: appears stated age and healthy   Orientation: alert and oriented to person, place, and time   Affect: appropriate   Gait: unimpaired       Right Foot/Ankle     Inspection and Palpation  Ecchymosis: none  Tenderness: (Mild tenderness to underlying the 5th metatarsal head)  Swelling: (5th MPJ)  Arch: pes cavus  Skin Exam: callus and ulcer; no erythema   Neurovascular  Dorsalis pedis: 2+  Posterior tibial: 2+  Capillary Refill: 2+  Varicose veins: not present  Saphenous nerve sensation: diminished  Tibial nerve sensation: diminished  Superficial peroneal nerve sensation: diminished  Deep peroneal nerve sensation: diminished  Sural nerve sensation: diminished    Edema  Type of edema: non-pitting    Muscle Strength  Ankle dorsiflexion: 5  Ankle plantar flexion: 5  Ankle inversion: 5  Ankle eversion: 5  Great toe extension: 5  Great toe flexion: 5    Range of Motion    Normal right ankle ROM    Tests  Anterior drawer: negative   Talar tilt: negative   PT Tinel's sign: negative    Paresthesia: negative        Physical Exam  Cardiovascular:      Pulses:           Dorsalis pedis pulses are 2+ on the right side.        Posterior tibial pulses are 2+ on the right side.   Musculoskeletal:        Feet:    Feet:      Right foot:      Skin integrity: Ulcer and callus present. No erythema.               Right Ankle/Foot Exam     Range of Motion   The patient has normal right ankle ROM.        Muscle Strength   Right Lower Extremity   Ankle Dorsiflexion:  5   Plantar flexion:  5/5    Vascular Exam     Right Pulses  Dorsalis Pedis:      2+  Posterior  "Tibial:      2+           Imaging: none                Assessment:       1. Idiopathic peripheral neuropathy    2. Pes cavus of both feet    3. Ulcer of right foot with fat layer exposed      Plan:   Idiopathic peripheral neuropathy  -     AIR CAST WALKER BOOT FOR HOME USE  -     Wound Debridement    Pes cavus of both feet  -     AIR CAST WALKER BOOT FOR HOME USE    Ulcer of right foot with fat layer exposed  -     MRI Forefoot WO Contrast RT; Future; Expected date: 03/19/2025  -     clindamycin (CLEOCIN) 300 MG capsule; Take 1 capsule (300 mg total) by mouth every 8 (eight) hours. for 14 days  Dispense: 42 capsule; Refill: 0  -     mupirocin (BACTROBAN) 2 % ointment; Apply topically 3 (three) times daily.  Dispense: 30 g; Refill: 3  -     AIR CAST WALKER BOOT FOR HOME USE  -     Wound Debridement      Follow up if symptoms worsen or fail to improve, for pending MRI results.    I discussed with the patient findings of ulceration underlying the right 5th metatarsal.  Explained this forms due excess pressure and callus formation breaking in the skin.  Wound was debrided today.  I am sending an empiric antibiotics for him.  Patient will be placed in a Cam Walker boot.  He was given instructions on daily dressings.  Given that the wound has been present for several weeks and patient states it was previously drainage I am also going to order an for further evaluation.    Wound Debridement    Date/Time: 3/19/2025 10:20 AM    Performed by: Jaime Green DPM  Authorized by: Jaime Green DPM    Time out: Immediately prior to procedure a "time out" was called to verify the correct patient, procedure, equipment, support staff and site/side marked as required.    Consent Done?:  Yes (Verbal)    Preparation: Patient was prepped and draped in usual sterile fashion    Local anesthesia used?: No      Location:  Right 5th Metatarsal Head    Type of Debridement:  Excisional       Length (cm):  0.5       Width (cm):  0.3       " Depth (cm):  0.2       Area (sq cm):  0.12       Percent Debrided (%):  100       Total Area Debrided (sq cm):  0.12    Depth of debridement:  Subcutaneous tissue    Tissue debrided:  Subcutaneous, Epidermis and Dermis    Devitalized tissue debrided:  Slough, Biofilm, Callus and Fibrin    Instruments:  Blade  Bleeding:  Minimal  Patient tolerance:  Patient tolerated the procedure well with no immediate complications            Counseling:     I provided patient education verbally regarding:   Patient diagnosis, treatment options, as well as alternatives, risks, and benefits.     This note was created using Dragon voice recognition software that occasionally misinterpreted phrases or words.                    [1]   Current Outpatient Medications   Medication Sig Dispense Refill    aspirin-acetaminophen-caffeine 250-250-65 mg (EXCEDRIN EXTRA STRENGTH) 250-250-65 mg per tablet Take 1 tablet by mouth every 6 (six) hours as needed for Pain. (Patient not taking: Reported on 8/7/2024)      clindamycin (CLEOCIN) 300 MG capsule Take 1 capsule (300 mg total) by mouth every 8 (eight) hours. for 14 days 42 capsule 0    HYDROcodone-acetaminophen (NORCO)  mg per tablet Take 1 tablet by mouth every 6 (six) hours as needed for Pain. (Patient not taking: Reported on 8/7/2024) 20 tablet 0    mupirocin (BACTROBAN) 2 % ointment Apply 1 g topically 2 (two) times daily. (Patient not taking: Reported on 8/7/2024)      mupirocin (BACTROBAN) 2 % ointment Apply topically 3 (three) times daily. 30 g 3     No current facility-administered medications for this visit.

## 2025-03-26 ENCOUNTER — HOSPITAL ENCOUNTER (OUTPATIENT)
Dept: RADIOLOGY | Facility: HOSPITAL | Age: 55
Discharge: HOME | End: 2025-03-26
Attending: PODIATRIST
Payer: MEDICAID

## 2025-03-26 DIAGNOSIS — L97.512 ULCER OF RIGHT FOOT WITH FAT LAYER EXPOSED: ICD-10-CM

## 2025-03-26 PROCEDURE — 73718 MRI LOWER EXTREMITY W/O DYE: CPT | Mod: TC,RT

## 2025-03-26 PROCEDURE — 73718 MRI LOWER EXTREMITY W/O DYE: CPT | Mod: 26,RT,, | Performed by: RADIOLOGY

## 2025-03-27 ENCOUNTER — RESULTS FOLLOW-UP (OUTPATIENT)
Dept: PODIATRY | Facility: HOSPITAL | Age: 55
End: 2025-03-27

## (undated) DEVICE — SOL NACL IRR 1000ML BTL

## (undated) DEVICE — CONTAINER SPECIMEN OR STER 4OZ

## (undated) DEVICE — PACK CUSTOM UNIV BASIN SLI

## (undated) DEVICE — BANDAGE ROLL COTTN 4.5INX4.1YD

## (undated) DEVICE — PACK EXTREMITY ORTHOMAX

## (undated) DEVICE — ELECTRODE REM PLYHSV RETURN 9

## (undated) DEVICE — GLOVE SENSICARE PI ALOE 7.5

## (undated) DEVICE — TOURNIQUET SB QC DP 18X4IN

## (undated) DEVICE — BLADE AGGR MED NARROW 18X5.5MM

## (undated) DEVICE — BANDAGE ESMARK ELASTIC ST 4X9

## (undated) DEVICE — DRESSING N ADH OIL EMUL 3X3

## (undated) DEVICE — STRAP OR TABLE 5IN X 72IN

## (undated) DEVICE — DRAPE THREE-QTR REINF 53X77IN

## (undated) DEVICE — SLEEVE SCD EXPRESS KNEE MEDIUM

## (undated) DEVICE — BOOT SHORT 1PC LOW PROF LRG